# Patient Record
Sex: MALE | Race: BLACK OR AFRICAN AMERICAN | Employment: UNEMPLOYED | URBAN - METROPOLITAN AREA
[De-identification: names, ages, dates, MRNs, and addresses within clinical notes are randomized per-mention and may not be internally consistent; named-entity substitution may affect disease eponyms.]

---

## 2017-09-09 ENCOUNTER — HOSPITAL ENCOUNTER (EMERGENCY)
Age: 22
Discharge: HOME | End: 2017-09-09
Attending: EMERGENCY MEDICINE
Payer: MEDICAID

## 2017-09-09 VITALS
HEIGHT: 72 IN | OXYGEN SATURATION: 96 % | BODY MASS INDEX: 36.57 KG/M2 | DIASTOLIC BLOOD PRESSURE: 78 MMHG | HEART RATE: 84 BPM | RESPIRATION RATE: 14 BRPM | WEIGHT: 270 LBS | TEMPERATURE: 97.9 F | SYSTOLIC BLOOD PRESSURE: 132 MMHG

## 2017-09-09 DIAGNOSIS — S09.22XA: Primary | ICD-10-CM

## 2017-09-09 PROCEDURE — 6370000000 HC RX 637 (ALT 250 FOR IP): Performed by: EMERGENCY MEDICINE

## 2017-09-09 PROCEDURE — G0381 LEV 2 HOSP TYPE B ED VISIT: HCPCS

## 2017-09-09 RX ORDER — IBUPROFEN 800 MG/1
800 TABLET ORAL ONCE
Status: COMPLETED | OUTPATIENT
Start: 2017-09-09 | End: 2017-09-09

## 2017-09-09 RX ADMIN — IBUPROFEN 800 MG: 800 TABLET, FILM COATED ORAL at 14:34

## 2017-09-09 ASSESSMENT — PAIN SCALES - GENERAL: PAINLEVEL_OUTOF10: 7

## 2017-09-09 ASSESSMENT — ENCOUNTER SYMPTOMS
COLOR CHANGE: 0
VOMITING: 0
TROUBLE SWALLOWING: 0
NAUSEA: 0

## 2018-04-19 DIAGNOSIS — N46.9 INFERTILITY MALE: Primary | ICD-10-CM

## 2018-07-04 ENCOUNTER — HOSPITAL ENCOUNTER (EMERGENCY)
Age: 23
Discharge: HOME | End: 2018-07-05
Attending: EMERGENCY MEDICINE
Payer: MEDICAID

## 2018-07-04 ENCOUNTER — APPOINTMENT (OUTPATIENT)
Dept: CT IMAGING | Age: 23
End: 2018-07-04
Payer: MEDICAID

## 2018-07-04 VITALS
SYSTOLIC BLOOD PRESSURE: 152 MMHG | HEART RATE: 110 BPM | WEIGHT: 280 LBS | BODY MASS INDEX: 37.97 KG/M2 | TEMPERATURE: 97.2 F | RESPIRATION RATE: 16 BRPM | OXYGEN SATURATION: 99 % | DIASTOLIC BLOOD PRESSURE: 88 MMHG

## 2018-07-04 DIAGNOSIS — S01.81XA FACIAL LACERATION, INITIAL ENCOUNTER: Primary | ICD-10-CM

## 2018-07-04 PROCEDURE — 70450 CT HEAD/BRAIN W/O DYE: CPT

## 2018-07-04 PROCEDURE — 70486 CT MAXILLOFACIAL W/O DYE: CPT

## 2018-07-04 PROCEDURE — 12011 RPR F/E/E/N/L/M 2.5 CM/<: CPT

## 2018-07-04 PROCEDURE — 99284 EMERGENCY DEPT VISIT MOD MDM: CPT

## 2018-07-04 ASSESSMENT — ENCOUNTER SYMPTOMS
NAUSEA: 0
COLOR CHANGE: 0
EYE REDNESS: 0
EYE DISCHARGE: 0
ABDOMINAL PAIN: 0
CHEST TIGHTNESS: 0
FACIAL SWELLING: 1
EYE PAIN: 1
SHORTNESS OF BREATH: 0
VOMITING: 0

## 2018-07-04 ASSESSMENT — PAIN DESCRIPTION - ORIENTATION: ORIENTATION: RIGHT

## 2018-07-04 ASSESSMENT — PAIN DESCRIPTION - LOCATION: LOCATION: EYE

## 2018-07-04 ASSESSMENT — PAIN SCALES - GENERAL: PAINLEVEL_OUTOF10: 8

## 2018-07-04 ASSESSMENT — PAIN DESCRIPTION - PAIN TYPE: TYPE: ACUTE PAIN

## 2018-07-05 PROCEDURE — 96372 THER/PROPH/DIAG INJ SC/IM: CPT

## 2018-07-05 RX ORDER — ONDANSETRON 4 MG/1
4 TABLET, FILM COATED ORAL ONCE
Status: DISCONTINUED | OUTPATIENT
Start: 2018-07-05 | End: 2018-07-05 | Stop reason: HOSPADM

## 2018-07-05 RX ORDER — KETOROLAC TROMETHAMINE 30 MG/ML
30 INJECTION, SOLUTION INTRAMUSCULAR; INTRAVENOUS ONCE
Status: DISCONTINUED | OUTPATIENT
Start: 2018-07-05 | End: 2018-07-05 | Stop reason: HOSPADM
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
## 2018-07-12 ENCOUNTER — HOSPITAL ENCOUNTER (EMERGENCY)
Age: 23
Discharge: HOME | End: 2018-07-12
Attending: EMERGENCY MEDICINE
Payer: MEDICAID

## 2018-07-12 VITALS
DIASTOLIC BLOOD PRESSURE: 90 MMHG | TEMPERATURE: 98.2 F | HEART RATE: 91 BPM | SYSTOLIC BLOOD PRESSURE: 146 MMHG | OXYGEN SATURATION: 98 % | RESPIRATION RATE: 17 BRPM

## 2018-07-12 DIAGNOSIS — G44.309 POST-CONCUSSION HEADACHE: ICD-10-CM

## 2018-07-12 DIAGNOSIS — Z48.02 VISIT FOR SUTURE REMOVAL: Primary | ICD-10-CM

## 2018-07-12 PROCEDURE — 99281 EMR DPT VST MAYX REQ PHY/QHP: CPT

## 2018-07-12 ASSESSMENT — ENCOUNTER SYMPTOMS
ABDOMINAL PAIN: 0
EYE PAIN: 0
BACK PAIN: 0
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
## 2018-07-31 ENCOUNTER — HOSPITAL ENCOUNTER (EMERGENCY)
Age: 23
Discharge: PSYCHIATRIC OR SUBSTANCE USE FACILITY | End: 2018-07-31
Attending: EMERGENCY MEDICINE
Payer: MEDICAID

## 2018-07-31 VITALS
TEMPERATURE: 98.2 F | HEART RATE: 71 BPM | SYSTOLIC BLOOD PRESSURE: 151 MMHG | RESPIRATION RATE: 16 BRPM | DIASTOLIC BLOOD PRESSURE: 87 MMHG | OXYGEN SATURATION: 98 %

## 2018-07-31 DIAGNOSIS — T14.91XA SUICIDE ATTEMPT (HCC): Primary | ICD-10-CM

## 2018-07-31 LAB
ACETAMINOPHEN LEVEL: <5 UG/ML (ref 10–30)
ALBUMIN SERPL-MCNC: 4.7 G/DL (ref 3.5–5.2)
ALBUMIN/GLOBULIN RATIO: 1.6 (ref 1–2.5)
ALP BLD-CCNC: 46 U/L (ref 40–129)
ALT SERPL-CCNC: 32 U/L (ref 5–41)
AST SERPL-CCNC: 33 U/L
BILIRUB SERPL-MCNC: 0.59 MG/DL (ref 0.3–1.2)
BILIRUBIN DIRECT: 0.15 MG/DL
BILIRUBIN, INDIRECT: 0.44 MG/DL (ref 0–1)
EKG ATRIAL RATE: 83 BPM
EKG P AXIS: 61 DEGREES
EKG P-R INTERVAL: 152 MS
EKG Q-T INTERVAL: 386 MS
EKG QRS DURATION: 88 MS
EKG QTC CALCULATION (BAZETT): 453 MS
EKG R AXIS: 68 DEGREES
EKG T AXIS: 15 DEGREES
EKG VENTRICULAR RATE: 83 BPM
ETHANOL PERCENT: 0.01 %
ETHANOL: 11 MG/DL
GLOBULIN: NORMAL G/DL (ref 1.5–3.8)
SALICYLATE LEVEL: <1 MG/DL (ref 3–10)
TOTAL PROTEIN: 7.7 G/DL (ref 6.4–8.3)
TOXIC TRICYCLIC SC,BLOOD: NEGATIVE

## 2018-07-31 PROCEDURE — G0480 DRUG TEST DEF 1-7 CLASSES: HCPCS

## 2018-07-31 PROCEDURE — 96374 THER/PROPH/DIAG INJ IV PUSH: CPT

## 2018-07-31 PROCEDURE — 96375 TX/PRO/DX INJ NEW DRUG ADDON: CPT

## 2018-07-31 PROCEDURE — 80307 DRUG TEST PRSMV CHEM ANLYZR: CPT

## 2018-07-31 PROCEDURE — 99285 EMERGENCY DEPT VISIT HI MDM: CPT

## 2018-07-31 PROCEDURE — 6360000002 HC RX W HCPCS: Performed by: EMERGENCY MEDICINE

## 2018-07-31 PROCEDURE — 80076 HEPATIC FUNCTION PANEL: CPT

## 2018-07-31 PROCEDURE — 93005 ELECTROCARDIOGRAM TRACING: CPT

## 2018-07-31 RX ORDER — KETOROLAC TROMETHAMINE 15 MG/ML
15 INJECTION, SOLUTION INTRAMUSCULAR; INTRAVENOUS ONCE
Status: COMPLETED | OUTPATIENT
Start: 2018-07-31 | End: 2018-07-31

## 2018-07-31 RX ORDER — DIPHENHYDRAMINE HYDROCHLORIDE 50 MG/ML
25 INJECTION INTRAMUSCULAR; INTRAVENOUS ONCE
Status: COMPLETED | OUTPATIENT
Start: 2018-07-31 | End: 2018-07-31

## 2018-07-31 RX ORDER — KETOROLAC TROMETHAMINE 15 MG/ML
15 INJECTION, SOLUTION INTRAMUSCULAR; INTRAVENOUS ONCE
Status: DISCONTINUED | OUTPATIENT
Start: 2018-07-31 | End: 2018-07-31

## 2018-07-31 RX ORDER — DIPHENHYDRAMINE HCL 25 MG
25 TABLET ORAL ONCE
Status: DISCONTINUED | OUTPATIENT
Start: 2018-07-31 | End: 2018-07-31

## 2018-07-31 RX ADMIN — PROCHLORPERAZINE EDISYLATE 10 MG: 5 INJECTION INTRAMUSCULAR; INTRAVENOUS at 12:25

## 2018-07-31 RX ADMIN — DIPHENHYDRAMINE HYDROCHLORIDE 25 MG: 50 INJECTION, SOLUTION INTRAMUSCULAR; INTRAVENOUS at 12:25

## 2018-07-31 RX ADMIN — KETOROLAC TROMETHAMINE 15 MG: 15 INJECTION, SOLUTION INTRAMUSCULAR; INTRAVENOUS at 12:25

## 2018-07-31 ASSESSMENT — PAIN SCALES - GENERAL
PAINLEVEL_OUTOF10: 6
PAINLEVEL_OUTOF10: 6

## 2018-07-31 NOTE — ED NOTES
Spoke with [facility] RN Michael who reports that for medical clearance they need a blood alcohol level, tylenol level and liver enzymes. Also, comfort care for patient for headache and vomiting. No other needs at this time.       oP Mejia, [facility]  07/31/18 1042

## 2018-07-31 NOTE — ED NOTES
Spoke to [facility] re: pt ready for . Will continue to monitor.       Latanya Salazar, [facility]  07/31/18 8064
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
## 2018-07-31 NOTE — ED NOTES
Bed: 28  Expected date:   Expected time:   Means of arrival:   Comments:     Colette Abdul RN  07/31/18 102

## 2019-04-25 ENCOUNTER — HOSPITAL ENCOUNTER (EMERGENCY)
Age: 24
Discharge: HOME | End: 2019-04-25
Attending: EMERGENCY MEDICINE
Payer: MEDICAID

## 2019-04-25 VITALS
DIASTOLIC BLOOD PRESSURE: 64 MMHG | OXYGEN SATURATION: 97 % | TEMPERATURE: 98 F | HEIGHT: 72 IN | RESPIRATION RATE: 18 BRPM | BODY MASS INDEX: 40.52 KG/M2 | SYSTOLIC BLOOD PRESSURE: 124 MMHG | HEART RATE: 69 BPM | WEIGHT: 299.19 LBS

## 2019-04-25 DIAGNOSIS — L30.9 DERMATITIS: Primary | ICD-10-CM

## 2019-04-25 PROCEDURE — 99282 EMERGENCY DEPT VISIT SF MDM: CPT

## 2019-04-25 RX ORDER — PREDNISONE 50 MG/1
50 TABLET ORAL DAILY
Qty: 5 TABLET | Refills: 0 | Status: SHIPPED | OUTPATIENT
Start: 2019-04-25 | End: 2019-04-30

## 2019-04-25 RX ORDER — CLOTRIMAZOLE AND BETAMETHASONE DIPROPIONATE 10; .64 MG/G; MG/G
CREAM TOPICAL
Qty: 1 TUBE | Refills: 0 | Status: SHIPPED | OUTPATIENT
Start: 2019-04-25 | End: 2019-11-10

## 2019-04-25 RX ORDER — DIPHENHYDRAMINE HCL 25 MG
25 CAPSULE ORAL EVERY 6 HOURS PRN
Qty: 20 CAPSULE | Refills: 0 | Status: SHIPPED | OUTPATIENT
Start: 2019-04-25 | End: 2019-11-10

## 2019-04-25 ASSESSMENT — ENCOUNTER SYMPTOMS
RHINORRHEA: 0
SHORTNESS OF BREATH: 0
CONSTIPATION: 0
ABDOMINAL PAIN: 0
COUGH: 0
NAUSEA: 0
WHEEZING: 0
DIARRHEA: 0
COLOR CHANGE: 0
SINUS PRESSURE: 0
SORE THROAT: 0
VOMITING: 0
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
## 2019-04-25 NOTE — ED NOTES
ASSESSMENT:   Presents to ED with a dark pigmented rash to post neck. Thinks is from a necklace. Also has the same to his rt lower abdom, on his fat apron. States usually gets it in the summer when it's hot and he sweats, but has it  Now and it itches. No other c/o's. Mary Knapp RN  04/25/19 PIOTR Jacob RN  04/25/19 9471

## 2019-04-26 ENCOUNTER — TELEPHONE (OUTPATIENT)
Dept: DERMATOLOGY | Age: 24
End: 2019-04-26

## 2019-05-22 ENCOUNTER — HOSPITAL ENCOUNTER (EMERGENCY)
Age: 24
Discharge: HOME | End: 2019-05-22
Attending: EMERGENCY MEDICINE
Payer: COMMERCIAL

## 2019-05-22 VITALS
SYSTOLIC BLOOD PRESSURE: 132 MMHG | WEIGHT: 289.1 LBS | TEMPERATURE: 98.1 F | RESPIRATION RATE: 20 BRPM | HEART RATE: 89 BPM | BODY MASS INDEX: 39.16 KG/M2 | DIASTOLIC BLOOD PRESSURE: 76 MMHG | OXYGEN SATURATION: 100 % | HEIGHT: 72 IN

## 2019-05-22 DIAGNOSIS — M79.605 BILATERAL LEG PAIN: Primary | ICD-10-CM

## 2019-05-22 DIAGNOSIS — M79.604 BILATERAL LEG PAIN: Primary | ICD-10-CM

## 2019-05-22 LAB
ANION GAP SERPL CALCULATED.3IONS-SCNC: 12 MMOL/L (ref 9–17)
BUN BLDV-MCNC: 5 MG/DL (ref 6–20)
BUN/CREAT BLD: 6 (ref 9–20)
CALCIUM SERPL-MCNC: 9.2 MG/DL (ref 8.6–10.4)
CHLORIDE BLD-SCNC: 102 MMOL/L (ref 98–107)
CO2: 27 MMOL/L (ref 20–31)
CREAT SERPL-MCNC: 0.87 MG/DL (ref 0.7–1.2)
GFR AFRICAN AMERICAN: >60 ML/MIN
GFR NON-AFRICAN AMERICAN: >60 ML/MIN
GFR SERPL CREATININE-BSD FRML MDRD: ABNORMAL ML/MIN/{1.73_M2}
GFR SERPL CREATININE-BSD FRML MDRD: ABNORMAL ML/MIN/{1.73_M2}
GLUCOSE BLD-MCNC: 99 MG/DL (ref 70–99)
MAGNESIUM: 1.8 MG/DL (ref 1.6–2.6)
POTASSIUM SERPL-SCNC: 3.8 MMOL/L (ref 3.7–5.3)
SODIUM BLD-SCNC: 141 MMOL/L (ref 135–144)

## 2019-05-22 PROCEDURE — 99283 EMERGENCY DEPT VISIT LOW MDM: CPT

## 2019-05-22 PROCEDURE — 83735 ASSAY OF MAGNESIUM: CPT

## 2019-05-22 PROCEDURE — 6370000000 HC RX 637 (ALT 250 FOR IP): Performed by: NURSE PRACTITIONER

## 2019-05-22 PROCEDURE — 80048 BASIC METABOLIC PNL TOTAL CA: CPT

## 2019-05-22 RX ORDER — CALCIUM CARBONATE/VITAMIN D3 500-10/5ML
1 LIQUID (ML) ORAL EVERY EVENING
Qty: 24 CAPSULE | Refills: 0 | Status: SHIPPED | OUTPATIENT
Start: 2019-05-22 | End: 2019-11-10

## 2019-05-22 RX ORDER — CYCLOBENZAPRINE HCL 10 MG
10 TABLET ORAL ONCE
Status: COMPLETED | OUTPATIENT
Start: 2019-05-22 | End: 2019-05-22

## 2019-05-22 RX ORDER — CYCLOBENZAPRINE HCL 10 MG
10 TABLET ORAL 3 TIMES DAILY PRN
Qty: 18 TABLET | Refills: 0 | Status: SHIPPED | OUTPATIENT
Start: 2019-05-22 | End: 2019-05-28

## 2019-05-22 RX ADMIN — CYCLOBENZAPRINE HYDROCHLORIDE 10 MG: 10 TABLET, FILM COATED ORAL at 21:37

## 2019-05-22 ASSESSMENT — PAIN SCALES - GENERAL: PAINLEVEL_OUTOF10: 8

## 2019-05-23 NOTE — ED PROVIDER NOTES
[facility] ED  eMERGENCY dEPARTMENT eNCOUnter      Pt Name: Becky Chawla  MRN: [id number]  [place] 1995  Date of evaluation: 5/22/2019  Provider: Kaila Zeng       Chief Complaint   Patient presents with    Leg Pain     bilateral calf          HISTORY OF PRESENT ILLNESS  (Location/Symptom, Timing/Onset, Context/Setting, Quality, Duration, Modifying Factors, Severity.)   Becky Chawla is a 20 y.o. male who presents to the emergency department with complaints of bilateral calf pain. Onset 2 days ago. He states that as he moves around he begins to feel both legs cramping up. He also complains that he has \"charley horses\" during the night as well. He denies any trauma, use of any medications other than Lotrisone cream, no long travel, no history of DVT. He denies any sensory loss. He has been up and ambulatory. He is taken nothing for the symptoms. Nursing Notes were reviewed. ALLERGIES     Patient has no known allergies. CURRENT MEDICATIONS       Discharge Medication List as of 5/22/2019 10:48 PM      CONTINUE these medications which have NOT CHANGED    Details   clotrimazole-betamethasone (LOTRISONE) 1-0.05 % cream Apply topically 2 times daily. , Disp-1 Tube, R-0, Print      diphenhydrAMINE (BENADRYL) 25 MG capsule Take 1 capsule by mouth every 6 hours as needed for Itching, Disp-20 capsule, R-0Print      Acetaminophen 500 MG CAPS Take 1,000 mg by mouth every 6 hours as needed for Pain, Disp-60 capsule, R-0Print             PAST MEDICAL HISTORY   No past medical history on file. SURGICAL HISTORY     No past surgical history on file. FAMILY HISTORY     No family history on file. No family status information on file. SOCIAL HISTORY      reports that he has been smoking. He has been smoking about 0.50 packs per day. He has never used smokeless tobacco. He reports that he drinks alcohol.  He reports that he has current or past drug history. Drug: Marijuana. REVIEW OF SYSTEMS    (2-9 systems for level 4, 10 or more for level 5)     Denies fever/chills  Denies lightheadedness, headache  Denies URI symptoms  Denies recent travel, injury to the legs, long periods of immobilization  Denies Neck or back pain  Denies Shortness of breath, cough  Denies chest pain, palpitations       Except as noted above the remainder of the review of systems was reviewed and negative. PHYSICAL EXAM    (up to 7 for level 4, 8 or more for level 5)     ED Triage Vitals   BP Temp Temp src Pulse Resp SpO2 Height Weight   05/22/19 1925 05/22/19 1924 -- 05/22/19 1924 05/22/19 1924 05/22/19 1924 05/22/19 1924 05/22/19 1924   132/76 98.1 °F (36.7 °C)  89 20 100 % 6' (1.829 m) 289 lb 1.6 oz (131.1 kg)         General Appearance:  Alert, cooperative,    Head:  Normocephalic,     Eyes:  conjunctiva/corneas clear, EOM's intact. ENT: Mucous membranes moist.      Neck: Supple,  trachea midline. Lungs:   Lungs clear to auscultation. Pepe Bajwa Respirations eupneic   Heart: RRR, Normal skin color   Extremities:    Full ROM without painOr distal swelling. Bilateral discomfort to both calfs. No swelling, erythema. Pain increases with extension of legs. Negative Mateus's. Legs of equal warmth. Pulses:  DP/PT pulses 3+/4   Skin: No rashes or lesions to exposed skin   Neurologic: Alert and oriented.  Motor grossly normal. Speech clear                 DIAGNOSTIC RESULTS      RADIOLOGY:   Non-plain film images such as CT, Ultrasound and MRI are read by the radiologist. Plain radiographic images are visualized and preliminarily interpreted by the emergency physician with the below findings:       Interpretation per the Radiologist below, if available at the time of this note:    No orders to display           LABS:  [address] - Abnormal; Notable for the following components:       Result Value    BUN 5 (*)     Bun/Cre Ratio 6 (*)     All other components within normal limits   MAGNESIUM       All other labs were within normal range or not returned as of this dictation. EMERGENCY DEPARTMENT COURSE and DIFFERENTIAL DIAGNOSIS/MDM:   Vitals:    Vitals:    19 1924 19   BP:  132/76   Pulse: 89    Resp: 20    Temp: 98.1 °F (36.7 °C)    SpO2: 100%    Weight: 289 lb 1.6 oz (131.1 kg)    Height: 6' (1.829 m)        Medical Decision Makinyo male with \"charley horses\" of both legs. Electrolytes and magnesium normal. No concern for DVT. He will be discharged with instructions to use mag oxide, increase fluids and eat a balanced diet. The patient left upright and ambulatory without limp. CONSULTS:  None    PROCEDURES:  None    FINAL IMPRESSION      1.  Bilateral leg pain          DISPOSITION/PLAN   DISPOSITION Decision To Discharge 2019 10:46:31 PM      PATIENT REFERRED TO:   [facility] ED  [address] [phone]  Go to   As needed, If symptoms worsen      DISCHARGE MEDICATIONS:     Discharge Medication List as of 2019 10:48 PM      START taking these medications    Details   cyclobenzaprine (FLEXERIL) 10 MG tablet Take 1 tablet by mouth 3 times daily as needed for Muscle spasms, Disp-18 tablet, R-0Print      Magnesium Oxide 400 MG CAPS Take 1 capsule by mouth every evening, Disp-24 capsule, R-0Print                 (Please note that portions of this note were completed with a voice recognition program.  Efforts were made to edit the dictations but occasionally words are mis-transcribed.)    MANSOOR Lamar CNP  Certified Nurse Practitioner          MANSOOR Lamar CNP  [phone] [facility] APRN - CNP  [phone]

## 2019-05-23 NOTE — ED TRIAGE NOTES
Pt to ED c/o bilateral calf pain x2 days. Pt AOx4. States he has done more walking than usual. Pain intermittent. Relieved by elevating legs. Pt denies other symptoms. Patent airway, no dyspnea or cyanosis noted.

## 2019-11-10 ENCOUNTER — HOSPITAL ENCOUNTER (EMERGENCY)
Age: 24
Discharge: HOME | End: 2019-11-10
Attending: EMERGENCY MEDICINE
Payer: MEDICAID

## 2019-11-10 VITALS
TEMPERATURE: 98.2 F | HEIGHT: 71 IN | DIASTOLIC BLOOD PRESSURE: 70 MMHG | OXYGEN SATURATION: 99 % | HEART RATE: 93 BPM | RESPIRATION RATE: 14 BRPM | SYSTOLIC BLOOD PRESSURE: 131 MMHG | WEIGHT: 285 LBS | BODY MASS INDEX: 39.9 KG/M2

## 2019-11-10 DIAGNOSIS — B00.1 COLD SORE: Primary | ICD-10-CM

## 2019-11-10 PROCEDURE — 99283 EMERGENCY DEPT VISIT LOW MDM: CPT

## 2019-11-10 RX ORDER — VALACYCLOVIR HYDROCHLORIDE 1 G/1
1000 TABLET, FILM COATED ORAL 3 TIMES DAILY
Qty: 21 TABLET | Refills: 0 | Status: SHIPPED | OUTPATIENT
Start: 2019-11-10 | End: 2019-11-17

## 2019-11-10 ASSESSMENT — PAIN SCALES - GENERAL: PAINLEVEL_OUTOF10: 3

## 2019-11-10 ASSESSMENT — ENCOUNTER SYMPTOMS
FACIAL SWELLING: 0
SINUS PAIN: 0
EYES NEGATIVE: 1
SINUS PRESSURE: 0
SORE THROAT: 0
COUGH: 0
RHINORRHEA: 0
WHEEZING: 0
CHEST TIGHTNESS: 0
VOMITING: 0
TROUBLE SWALLOWING: 0
VOICE CHANGE: 0
NAUSEA: 0

## 2019-11-10 ASSESSMENT — PAIN DESCRIPTION - ORIENTATION: ORIENTATION: LOWER;OUTER

## 2019-11-10 ASSESSMENT — PAIN DESCRIPTION - LOCATION: LOCATION: MOUTH

## 2019-11-10 ASSESSMENT — PAIN DESCRIPTION - DESCRIPTORS: DESCRIPTORS: TINGLING

## 2020-01-20 ENCOUNTER — APPOINTMENT (OUTPATIENT)
Dept: GENERAL RADIOLOGY | Age: 25
End: 2020-01-20
Payer: MEDICAID

## 2020-01-20 ENCOUNTER — HOSPITAL ENCOUNTER (EMERGENCY)
Age: 25
Discharge: HOME | End: 2020-01-20
Attending: EMERGENCY MEDICINE
Payer: MEDICAID

## 2020-01-20 VITALS
TEMPERATURE: 97 F | HEIGHT: 71 IN | DIASTOLIC BLOOD PRESSURE: 77 MMHG | SYSTOLIC BLOOD PRESSURE: 128 MMHG | BODY MASS INDEX: 39.9 KG/M2 | HEART RATE: 80 BPM | WEIGHT: 285 LBS | OXYGEN SATURATION: 100 % | RESPIRATION RATE: 15 BRPM

## 2020-01-20 PROCEDURE — 12002 RPR S/N/AX/GEN/TRNK2.6-7.5CM: CPT

## 2020-01-20 PROCEDURE — 2500000003 HC RX 250 WO HCPCS: Performed by: STUDENT IN AN ORGANIZED HEALTH CARE EDUCATION/TRAINING PROGRAM

## 2020-01-20 PROCEDURE — 90471 IMMUNIZATION ADMIN: CPT | Performed by: STUDENT IN AN ORGANIZED HEALTH CARE EDUCATION/TRAINING PROGRAM

## 2020-01-20 PROCEDURE — 99283 EMERGENCY DEPT VISIT LOW MDM: CPT

## 2020-01-20 PROCEDURE — 73130 X-RAY EXAM OF HAND: CPT

## 2020-01-20 PROCEDURE — 90715 TDAP VACCINE 7 YRS/> IM: CPT | Performed by: STUDENT IN AN ORGANIZED HEALTH CARE EDUCATION/TRAINING PROGRAM

## 2020-01-20 PROCEDURE — 6360000002 HC RX W HCPCS: Performed by: STUDENT IN AN ORGANIZED HEALTH CARE EDUCATION/TRAINING PROGRAM

## 2020-01-20 PROCEDURE — 6360000002 HC RX W HCPCS

## 2020-01-20 RX ORDER — CEFAZOLIN SODIUM 1 G/50ML
INJECTION, SOLUTION INTRAVENOUS
Status: COMPLETED
Start: 2020-01-20 | End: 2020-01-20

## 2020-01-20 RX ORDER — LIDOCAINE HYDROCHLORIDE 10 MG/ML
20 INJECTION, SOLUTION INFILTRATION; PERINEURAL ONCE
Status: COMPLETED | OUTPATIENT
Start: 2020-01-20 | End: 2020-01-20

## 2020-01-20 RX ORDER — IBUPROFEN 800 MG/1
800 TABLET ORAL ONCE
Status: DISCONTINUED | OUTPATIENT
Start: 2020-01-20 | End: 2020-01-20 | Stop reason: HOSPADM

## 2020-01-20 RX ORDER — CEPHALEXIN 500 MG/1
500 CAPSULE ORAL 4 TIMES DAILY
Qty: 40 CAPSULE | Refills: 0 | Status: SHIPPED | OUTPATIENT
Start: 2020-01-20 | End: 2020-01-30

## 2020-01-20 RX ORDER — FENTANYL CITRATE 50 UG/ML
25 INJECTION, SOLUTION INTRAMUSCULAR; INTRAVENOUS ONCE
Status: COMPLETED | OUTPATIENT
Start: 2020-01-20 | End: 2020-01-20

## 2020-01-20 RX ORDER — FENTANYL CITRATE 50 UG/ML
INJECTION, SOLUTION INTRAMUSCULAR; INTRAVENOUS
Status: COMPLETED
Start: 2020-01-20 | End: 2020-01-20

## 2020-01-20 RX ORDER — IBUPROFEN 600 MG/1
600 TABLET ORAL EVERY 6 HOURS PRN
Qty: 20 TABLET | Refills: 0 | Status: SHIPPED | OUTPATIENT
Start: 2020-01-20 | End: 2022-01-01

## 2020-01-20 RX ORDER — ACETAMINOPHEN 325 MG/1
650 TABLET ORAL EVERY 6 HOURS PRN
Qty: 20 TABLET | Refills: 0 | Status: SHIPPED | OUTPATIENT
Start: 2020-01-20 | End: 2022-01-01

## 2020-01-20 RX ADMIN — TETANUS TOXOID, REDUCED DIPHTHERIA TOXOID AND ACELLULAR PERTUSSIS VACCINE, ADSORBED 0.5 ML: 5; 2.5; 8; 8; 2.5 SUSPENSION INTRAMUSCULAR at 08:52

## 2020-01-20 RX ADMIN — CEFAZOLIN SODIUM 2 G: 1 INJECTION, SOLUTION INTRAVENOUS at 10:16

## 2020-01-20 RX ADMIN — FENTANYL CITRATE 25 MCG: 50 INJECTION, SOLUTION INTRAMUSCULAR; INTRAVENOUS at 10:35

## 2020-01-20 RX ADMIN — LIDOCAINE HYDROCHLORIDE 20 ML: 10 INJECTION, SOLUTION INFILTRATION; PERINEURAL at 08:52

## 2020-01-20 ASSESSMENT — PAIN DESCRIPTION - DESCRIPTORS: DESCRIPTORS: ACHING

## 2020-01-20 ASSESSMENT — PAIN SCALES - GENERAL
PAINLEVEL_OUTOF10: 10

## 2020-01-20 ASSESSMENT — PAIN DESCRIPTION - PAIN TYPE: TYPE: ACUTE PAIN

## 2020-01-20 ASSESSMENT — PAIN DESCRIPTION - PROGRESSION: CLINICAL_PROGRESSION: GRADUALLY WORSENING

## 2020-01-20 ASSESSMENT — PAIN DESCRIPTION - LOCATION: LOCATION: HAND

## 2020-01-20 ASSESSMENT — PAIN DESCRIPTION - ORIENTATION: ORIENTATION: RIGHT

## 2020-01-20 ASSESSMENT — PAIN DESCRIPTION - ONSET: ONSET: SUDDEN

## 2020-01-20 NOTE — CONSULTS
Orthopedic Surgery Consult  (Dr. Bereket Vargas)                   CC/Reason for consult:  Right hand foreign body    HPI:    The patient is a 30 y.o. male patient who fell on the ice last night at 1 am. At the time he had a small lac on his right palm, did not think much of it. He had continued pain today and presented to the ED. Denies any other pains or concerns at this time. Did not hit head or LOC. Denies fevers or chills. Tetanus and Ancef given in ED. Past Medical History:    History reviewed. No pertinent past medical history. Past Surgical History:    History reviewed. No pertinent surgical history. Medications Prior to Admission:   Prior to Admission medications    Medication Sig Start Date End Date Taking? Authorizing Provider   cephALEXin (KEFLEX) 500 MG capsule Take 1 capsule by mouth 4 times daily for 10 days 1/20/20 1/30/20 Yes Elfego Camarillo MD   acetaminophen (TYLENOL) 325 MG tablet Take 2 tablets by mouth every 6 hours as needed for Pain 1/20/20  Yes Elfego Camarillo MD   ibuprofen (ADVIL;MOTRIN) 600 MG tablet Take 1 tablet by mouth every 6 hours as needed for Pain 1/20/20  Yes Elfego Camarillo MD       Allergies:    Patient has no known allergies.     Social History:   Social History     Socioeconomic History    Marital status: Single     Spouse name: None    Number of children: None    Years of education: None    Highest education level: None   Occupational History    None   Social Needs    Financial resource strain: None    Food insecurity:     Worry: None     Inability: None    Transportation needs:     Medical: None     Non-medical: None   Tobacco Use    Smoking status: Current Every Day Smoker     Packs/day: 0.50     Types: Cigarettes    Smokeless tobacco: Never Used   Substance and Sexual Activity    Alcohol use: Yes     Comment: social     Drug use: Yes     Types: Marijuana    Sexual activity: None   Lifestyle    Physical activity:     Days per week: None Minutes per session: None    Stress: None   Relationships    Social connections:     Talks on phone: None     Gets together: None     Attends Gnosticism service: None     Active member of club or organization: None     Attends meetings of clubs or organizations: None     Relationship status: None    Intimate partner violence:     Fear of current or ex partner: None     Emotionally abused: None     Physically abused: None     Forced sexual activity: None   Other Topics Concern    None   Social History Narrative    None       Family History:  History reviewed. No pertinent family history. REVIEW OF SYSTEMS:    Constitutional: Negative for fever and chills. HENT: Negative for congestion. Eyes: Negative for blurred vision and double vision. Respiratory: Negative for cough, shortness of breath and wheezing. Cardiovascular: Negative for chest pain and palpitations. Gastrointestinal: Negative for nausea. Negative for vomiting. Musculoskeletal: Positive for right hand pain. See HPI   Skin: Negative for itching and rash. Neurological: Negative for dizziness, sensory change and headaches. Psychiatric/Behavioral: Negative for depression and suicidal ideas. PHYSICAL EXAM:  /77   Pulse 80   Temp 97 °F (36.1 °C) (Oral)   Resp 15   Ht 5' 11\" (1.803 m)   Wt 285 lb (129.3 kg)   SpO2 100%   BMI 39.75 kg/m²     Gen: AAOx3, NAD, cooperative     Head: Normocephalic, atraumatic     Neck: Supple     Chest: Non labored breathing, b/l clavicles without TTP, crepitus, step off, or deformity    Cardiovascular: Regular rate, no dependent edema, distal pulses 2+     Respiratory: Chest symmetric, no accessory muscle use, normal respirations     Abdomen: Soft, NT, ND     RUE: Small 1 cm lac over thenar eminence. No erythema or drainage. No ecchymosis. Foreign body palpated in thenar. Tender to palpation over thenar eminence. Full AROM without pain shoulder/elbow/wrist. Compartments soft and compressible. Ulnar/Median/AIN/PIN motor intact. Median/Radial/Ulnar nerve SILT. Hand and fingers warm and well-perfused w/ BCR; radial pulse 2+. LABS:  No results for input(s): WBC, HGB, HCT, PLT, INR, PTT, NA, K, BUN, CREATININE, GLUCOSE, SEDRATE, CRP in the last 72 hours. Invalid input(s): PT     Radiology:   X-ray right hand demonstrates foreign body in thenar eminence, post removal films demonstrate interval removal of foreign body    A/P: 30 y.o. male being seen for right hand foreign body    -Foreign body removed under local anesthetic provided by ED. Copious irrigation performed. Procedure note below.  -Ancef and Tetanus updated in ED  -Maintain dressings as instructed  -Weight bearing: Non weight bearing right upper extremity  -Pain control per ED  -Ice and elevation for pain/swelling  -Keflex for one week  -Follow up with hand/burn clinic in one week for wound check  -Please page Ortho with any questions or concerns    Bedside incision and removal procedure note  The alternatives, benefits, and risks were discussed with the patient. After answering all questions to the patient's satisfaction, the patient agreed to proceed forward with Incision and removal and gave verbal consent for the procedure. Local anesthesia provided by ED resident. After sterile preparation with betadine and adequate anesthesia was obtained, a #11 blade was used to incise the skin over thenar extending the laceration 5 mm in the proximal direction over the foreign body. Thenar skin did have mild maceration from the fall over the radial aspect of laceration. A hemostat was used to probe and remove the foreign body, ensuring protection of the neurovascular structures. Wound was copiously irrigated with betadine/normal saline. 4-0 Prolene was used to close wound in a simple fashion by ED resident. The wound was then dressed with bacitracin and sterile dressings. Patient tolerated the procedure well and all questions/concerns were answered. Neurovascularly intact post procedure.     EBL <5MN, without complications.    ----------------------------------------  Kari Gomes DO  PGY-2, Department of [address], [place], [place]

## 2020-01-20 NOTE — ED PROVIDER NOTES
Emergency Medicine Attending Note    I have seen and examined the patient in conjunction with the Resident/MLP. Please see my key portion documented:      I agree with the assessment and plan as discussed with Dr. Brendan Jefferson. 49-year-old gentleman with somewhat late presentation of palmar laceration. The foreign body clearly visualized on x-ray that is radiodense. Very difficult removal and will consult with hand surgery. Will likely placed on antibiotics and leave laceration open and have patient follow-up. Electronically signed:  Tanya Bowers M.D.           Sindy Evans MD  01/20/20 8776
retrieve foreign body. Discussed with Dr. Jj Fiore, hand surgeon who recommended evaluation by orthopedic surgery resident and dose of IV antibiotics. Dr. Ciara Cerda performed foreign body removal successfully, removed approximately 1 x 1 cm stone. Patient tolerated well. I closed the patient's laceration without difficulty. Dr. Jj Fiore updated on successful foreign body removal and laceration repair. Will discharge patient with oral antibiotics. Tetanus updated in the emergency department. Patient became verbally abusive with staff prior to discharge due to persistent pain despite receiving fentanyl for pain as well as local anesthetic. Patient stable for outpatient follow-up. He was given strict return precautions for any signs of infection, he voices understanding. PROCEDURES:  Laceration Repair Procedure Note    Indication: Laceration    Procedure: The patient was placed in the appropriate position and anesthesia around the laceration was obtained by infiltration using 1% Lidocaine without epinephrine. The area was then cleansed with betadine and draped in a sterile fashion and irrigated with normal saline. The laceration was closed with 4-0 and 5-0 Prolene using interrupted sutures. There were no additional lacerations requiring repair. The wound area was then dressed with bacitracin and a sterile dressing. Total repaired wound length: 3 cm. Other Items: Suture count: 6    The patient tolerated the procedure well. Complications: None      CONSULTS:  IP CONSULT TO PLASTIC SURGERY  IP CONSULT TO ORTHOPEDIC SURGERY    CRITICAL CARE:  None    FINAL IMPRESSION      1.  Laceration of right hand with foreign body, initial encounter          DISPOSITION / PLAN     DISPOSITION Decision To Discharge 01/20/2020 11:27:26 AM      PATIENT REFERRED TO:  [facility] ED  [address] [address]  [phone]    If symptoms worsen    Monika Christianson

## 2021-06-21 ENCOUNTER — HOSPITAL ENCOUNTER (EMERGENCY)
Age: 26
Discharge: HOME | End: 2021-06-21
Attending: EMERGENCY MEDICINE
Payer: MEDICAID

## 2021-06-21 ENCOUNTER — APPOINTMENT (OUTPATIENT)
Dept: GENERAL RADIOLOGY | Age: 26
End: 2021-06-21
Payer: MEDICAID

## 2021-06-21 VITALS
HEIGHT: 72 IN | BODY MASS INDEX: 35.89 KG/M2 | HEART RATE: 78 BPM | RESPIRATION RATE: 16 BRPM | DIASTOLIC BLOOD PRESSURE: 67 MMHG | OXYGEN SATURATION: 99 % | SYSTOLIC BLOOD PRESSURE: 139 MMHG | WEIGHT: 265 LBS | TEMPERATURE: 98.3 F

## 2021-06-21 DIAGNOSIS — S39.012A STRAIN OF LUMBAR REGION, INITIAL ENCOUNTER: Primary | ICD-10-CM

## 2021-06-21 DIAGNOSIS — L05.91 PILONIDAL CYST: ICD-10-CM

## 2021-06-21 PROCEDURE — 99285 EMERGENCY DEPT VISIT HI MDM: CPT

## 2021-06-21 PROCEDURE — 72100 X-RAY EXAM L-S SPINE 2/3 VWS: CPT

## 2021-06-21 PROCEDURE — 6370000000 HC RX 637 (ALT 250 FOR IP): Performed by: EMERGENCY MEDICINE

## 2021-06-21 RX ORDER — DOXYCYCLINE 100 MG/1
100 TABLET ORAL 2 TIMES DAILY
Qty: 20 TABLET | Refills: 0 | Status: SHIPPED | OUTPATIENT
Start: 2021-06-21 | End: 2021-07-01

## 2021-06-21 RX ORDER — ACETAMINOPHEN 325 MG/1
650 TABLET ORAL EVERY 6 HOURS PRN
Qty: 40 TABLET | Refills: 0 | Status: SHIPPED | OUTPATIENT
Start: 2021-06-21 | End: 2022-01-01

## 2021-06-21 RX ORDER — IBUPROFEN 800 MG/1
800 TABLET ORAL ONCE
Status: COMPLETED | OUTPATIENT
Start: 2021-06-21 | End: 2021-06-21

## 2021-06-21 RX ORDER — IBUPROFEN 800 MG/1
800 TABLET ORAL EVERY 8 HOURS PRN
Qty: 30 TABLET | Refills: 0 | Status: SHIPPED | OUTPATIENT
Start: 2021-06-21 | End: 2022-01-01

## 2021-06-21 RX ORDER — ACETAMINOPHEN 325 MG/1
650 TABLET ORAL ONCE
Status: COMPLETED | OUTPATIENT
Start: 2021-06-21 | End: 2021-06-21

## 2021-06-21 RX ORDER — CYCLOBENZAPRINE HCL 10 MG
10 TABLET ORAL NIGHTLY PRN
Qty: 10 TABLET | Refills: 0 | Status: SHIPPED | OUTPATIENT
Start: 2021-06-21 | End: 2021-07-01

## 2021-06-21 RX ADMIN — IBUPROFEN 800 MG: 800 TABLET, FILM COATED ORAL at 13:18

## 2021-06-21 RX ADMIN — ACETAMINOPHEN 650 MG: 325 TABLET ORAL at 13:18

## 2021-06-21 ASSESSMENT — ENCOUNTER SYMPTOMS
EYE REDNESS: 0
SHORTNESS OF BREATH: 0
TROUBLE SWALLOWING: 0
BLOOD IN STOOL: 1
VOMITING: 0
BACK PAIN: 1
ABDOMINAL PAIN: 0

## 2021-06-21 ASSESSMENT — PAIN SCALES - GENERAL
PAINLEVEL_OUTOF10: 8
PAINLEVEL_OUTOF10: 8

## 2021-06-21 ASSESSMENT — PAIN DESCRIPTION - LOCATION: LOCATION: BACK

## 2021-06-21 ASSESSMENT — PAIN DESCRIPTION - DESCRIPTORS: DESCRIPTORS: SHARP;THROBBING

## 2021-06-21 ASSESSMENT — PAIN DESCRIPTION - FREQUENCY: FREQUENCY: INTERMITTENT
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
## 2021-06-21 NOTE — ED NOTES
Pt called out c/o rectal pain. Writer at bedside with DR. Monty Burr.   Pt has abscess noted to the right medial buttocks with serosanguinous drainage noted at this time      Saroj Maldonado RN  06/21/21 3062
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
## 2022-01-01 ENCOUNTER — HOSPITAL ENCOUNTER (EMERGENCY)
Age: 27
Discharge: ANOTHER HOSPITAL | End: 2022-01-01
Attending: EMERGENCY MEDICINE
Payer: MEDICAID

## 2022-01-01 VITALS
WEIGHT: 250 LBS | HEART RATE: 85 BPM | HEIGHT: 71 IN | OXYGEN SATURATION: 100 % | RESPIRATION RATE: 16 BRPM | BODY MASS INDEX: 35 KG/M2 | DIASTOLIC BLOOD PRESSURE: 77 MMHG | TEMPERATURE: 97.2 F | SYSTOLIC BLOOD PRESSURE: 131 MMHG

## 2022-01-01 DIAGNOSIS — F32.A DEPRESSION WITH SUICIDAL IDEATION: Primary | ICD-10-CM

## 2022-01-01 DIAGNOSIS — R45.851 DEPRESSION WITH SUICIDAL IDEATION: Primary | ICD-10-CM

## 2022-01-01 DIAGNOSIS — F11.10 OPIATE ABUSE, CONTINUOUS (HCC): ICD-10-CM

## 2022-01-01 LAB
ABSOLUTE EOS #: <0.03 K/UL (ref 0–0.44)
ABSOLUTE IMMATURE GRANULOCYTE: <0.03 K/UL (ref 0–0.3)
ABSOLUTE LYMPH #: 1.61 K/UL (ref 1.1–3.7)
ABSOLUTE MONO #: 0.32 K/UL (ref 0.1–1.2)
ACETAMINOPHEN LEVEL: <5 UG/ML (ref 10–30)
ALBUMIN SERPL-MCNC: 4 G/DL (ref 3.5–5.2)
ALBUMIN/GLOBULIN RATIO: 1.5 (ref 1–2.5)
ALP BLD-CCNC: 52 U/L (ref 40–129)
ALT SERPL-CCNC: 17 U/L (ref 5–41)
AMPHETAMINE SCREEN URINE: NEGATIVE
ANION GAP SERPL CALCULATED.3IONS-SCNC: 9 MMOL/L (ref 9–17)
AST SERPL-CCNC: 21 U/L
BARBITURATE SCREEN URINE: NEGATIVE
BASOPHILS # BLD: 0 % (ref 0–2)
BASOPHILS ABSOLUTE: <0.03 K/UL (ref 0–0.2)
BENZODIAZEPINE SCREEN, URINE: NEGATIVE
BILIRUB SERPL-MCNC: 0.39 MG/DL (ref 0.3–1.2)
BUN BLDV-MCNC: 5 MG/DL (ref 6–20)
BUN/CREAT BLD: ABNORMAL (ref 9–20)
BUPRENORPHINE URINE: ABNORMAL
CALCIUM SERPL-MCNC: 9.2 MG/DL (ref 8.6–10.4)
CANNABINOID SCREEN URINE: POSITIVE
CHLORIDE BLD-SCNC: 107 MMOL/L (ref 98–107)
CO2: 26 MMOL/L (ref 20–31)
COCAINE METABOLITE, URINE: POSITIVE
CREAT SERPL-MCNC: 0.79 MG/DL (ref 0.7–1.2)
DIFFERENTIAL TYPE: ABNORMAL
EOSINOPHILS RELATIVE PERCENT: 1 % (ref 1–4)
ETHANOL PERCENT: <0.01 %
ETHANOL: <10 MG/DL
GFR AFRICAN AMERICAN: >60 ML/MIN
GFR NON-AFRICAN AMERICAN: >60 ML/MIN
GFR SERPL CREATININE-BSD FRML MDRD: ABNORMAL ML/MIN/{1.73_M2}
GFR SERPL CREATININE-BSD FRML MDRD: ABNORMAL ML/MIN/{1.73_M2}
GLUCOSE BLD-MCNC: 90 MG/DL (ref 70–99)
HCT VFR BLD CALC: 45 % (ref 40.7–50.3)
HEMOGLOBIN: 15.1 G/DL (ref 13–17)
IMMATURE GRANULOCYTES: 0 %
LYMPHOCYTES # BLD: 37 % (ref 24–43)
MCH RBC QN AUTO: 30.1 PG (ref 25.2–33.5)
MCHC RBC AUTO-ENTMCNC: 33.6 G/DL (ref 28.4–34.8)
MCV RBC AUTO: 89.8 FL (ref 82.6–102.9)
MDMA URINE: ABNORMAL
METHADONE SCREEN, URINE: NEGATIVE
METHAMPHETAMINE, URINE: ABNORMAL
MONOCYTES # BLD: 7 % (ref 3–12)
NRBC AUTOMATED: 0 PER 100 WBC
OPIATES, URINE: NEGATIVE
OXYCODONE SCREEN URINE: NEGATIVE
PDW BLD-RTO: 11.6 % (ref 11.8–14.4)
PHENCYCLIDINE, URINE: NEGATIVE
PLATELET # BLD: 278 K/UL (ref 138–453)
PLATELET ESTIMATE: ABNORMAL
PMV BLD AUTO: 9.3 FL (ref 8.1–13.5)
POTASSIUM SERPL-SCNC: 4.4 MMOL/L (ref 3.7–5.3)
PROPOXYPHENE, URINE: ABNORMAL
RBC # BLD: 5.01 M/UL (ref 4.21–5.77)
RBC # BLD: ABNORMAL 10*6/UL
SALICYLATE LEVEL: <1 MG/DL (ref 3–10)
SARS-COV-2, RAPID: DETECTED
SEG NEUTROPHILS: 55 % (ref 36–65)
SEGMENTED NEUTROPHILS ABSOLUTE COUNT: 2.38 K/UL (ref 1.5–8.1)
SODIUM BLD-SCNC: 142 MMOL/L (ref 135–144)
SPECIMEN DESCRIPTION: ABNORMAL
TEST INFORMATION: ABNORMAL
TOTAL PROTEIN: 6.6 G/DL (ref 6.4–8.3)
TOXIC TRICYCLIC SC,BLOOD: NEGATIVE
TRICYCLIC ANTIDEPRESSANTS, UR: ABNORMAL
WBC # BLD: 4.3 K/UL (ref 3.5–11.3)
WBC # BLD: ABNORMAL 10*3/UL

## 2022-01-01 PROCEDURE — G0480 DRUG TEST DEF 1-7 CLASSES: HCPCS

## 2022-01-01 PROCEDURE — 80307 DRUG TEST PRSMV CHEM ANLYZR: CPT

## 2022-01-01 PROCEDURE — H0050 ALCOHOL/DRUG SERVICE 15 MIN: HCPCS | Performed by: SOCIAL WORKER

## 2022-01-01 PROCEDURE — 80143 DRUG ASSAY ACETAMINOPHEN: CPT

## 2022-01-01 PROCEDURE — 87635 SARS-COV-2 COVID-19 AMP PRB: CPT

## 2022-01-01 PROCEDURE — 80179 DRUG ASSAY SALICYLATE: CPT

## 2022-01-01 PROCEDURE — 80053 COMPREHEN METABOLIC PANEL: CPT

## 2022-01-01 PROCEDURE — 85025 COMPLETE CBC W/AUTO DIFF WBC: CPT

## 2022-01-01 PROCEDURE — 99285 EMERGENCY DEPT VISIT HI MDM: CPT

## 2022-01-01 ASSESSMENT — ENCOUNTER SYMPTOMS
NAUSEA: 0
SORE THROAT: 0
ABDOMINAL PAIN: 0
VOMITING: 0
CONSTIPATION: 0
SHORTNESS OF BREATH: 0
DIARRHEA: 0

## 2022-01-01 ASSESSMENT — PATIENT HEALTH QUESTIONNAIRE - PHQ9: SUM OF ALL RESPONSES TO PHQ QUESTIONS 1-9: 18

## 2022-01-01 NOTE — ED NOTES
Pt to ed with c/o wanting help for fentanyl use and feeling suicidal. Pt states he uses fentanyl, snorted this am. Pt states he feels a bit of nausea and abdominal due to starting feel like he is withdrawling. Pt states he has been feeling suicidal for the past several days with plan to stab himself. Pt has been admitted to [facility] in the past but it has been several years. Pt is alert, oriented, speaking in full, complete sentences, no distress noted. Pt states he is tired of doing this to himself and just wants to feel better, get help and not just a quick fix.  Pt states he has never been in treatment for opoid use in the past.      Yoshi Champion RN  01/01/22 2538

## 2022-01-01 NOTE — ED TRIAGE NOTES
\"I got on bad drugs and I don't feel safe with myself. I am ready to get help. \" Patient reports snorting Fentanyl last use was \"earlier today. \" Patient reports use of Fentanyl for 1 year.   Suicidal with a plan; \"I would try to stab myself\"

## 2022-01-02 ENCOUNTER — HOSPITAL ENCOUNTER (INPATIENT)
Age: 27
LOS: 2 days | Discharge: HOME | DRG: 751 | End: 2022-01-04
Attending: PSYCHIATRY & NEUROLOGY | Admitting: PSYCHIATRY & NEUROLOGY
Payer: MEDICAID

## 2022-01-02 PROBLEM — F32.3 MAJOR DEPRESSIVE DISORDER, SINGLE EPISODE, SEVERE WITH PSYCHOTIC FEATURES (HCC): Status: ACTIVE | Noted: 2022-01-02

## 2022-01-02 PROCEDURE — 6360000002 HC RX W HCPCS: Performed by: PSYCHIATRY & NEUROLOGY

## 2022-01-02 PROCEDURE — 99223 1ST HOSP IP/OBS HIGH 75: CPT | Performed by: PSYCHIATRY & NEUROLOGY

## 2022-01-02 PROCEDURE — 6370000000 HC RX 637 (ALT 250 FOR IP): Performed by: PSYCHIATRY & NEUROLOGY

## 2022-01-02 PROCEDURE — 1240000000 HC EMOTIONAL WELLNESS R&B

## 2022-01-02 PROCEDURE — APPSS60 APP SPLIT SHARED TIME 46-60 MINUTES: Performed by: PSYCHIATRY & NEUROLOGY

## 2022-01-02 RX ORDER — SERTRALINE HYDROCHLORIDE 25 MG/1
25 TABLET, FILM COATED ORAL DAILY
Status: DISCONTINUED | OUTPATIENT
Start: 2022-01-02 | End: 2022-01-04 | Stop reason: HOSPADM

## 2022-01-02 RX ORDER — HYDROXYZINE 50 MG/1
50 TABLET, FILM COATED ORAL 3 TIMES DAILY PRN
Status: DISCONTINUED | OUTPATIENT
Start: 2022-01-02 | End: 2022-01-04 | Stop reason: HOSPADM

## 2022-01-02 RX ORDER — POLYETHYLENE GLYCOL 3350 17 G/17G
17 POWDER, FOR SOLUTION ORAL DAILY PRN
Status: DISCONTINUED | OUTPATIENT
Start: 2022-01-02 | End: 2022-01-04 | Stop reason: HOSPADM

## 2022-01-02 RX ORDER — PROMETHAZINE HYDROCHLORIDE 25 MG/1
25 TABLET ORAL EVERY 6 HOURS PRN
Status: DISPENSED | OUTPATIENT
Start: 2022-01-02 | End: 2022-01-03

## 2022-01-02 RX ORDER — TRAZODONE HYDROCHLORIDE 50 MG/1
50 TABLET ORAL NIGHTLY PRN
Status: DISCONTINUED | OUTPATIENT
Start: 2022-01-02 | End: 2022-01-04 | Stop reason: HOSPADM

## 2022-01-02 RX ORDER — DICYCLOMINE HCL 20 MG
20 TABLET ORAL EVERY 6 HOURS PRN
Status: DISCONTINUED | OUTPATIENT
Start: 2022-01-02 | End: 2022-01-04 | Stop reason: HOSPADM

## 2022-01-02 RX ORDER — NICOTINE 21 MG/24HR
1 PATCH, TRANSDERMAL 24 HOURS TRANSDERMAL DAILY
Status: DISCONTINUED | OUTPATIENT
Start: 2022-01-02 | End: 2022-01-03

## 2022-01-02 RX ORDER — IBUPROFEN 400 MG/1
400 TABLET ORAL EVERY 6 HOURS PRN
Status: DISCONTINUED | OUTPATIENT
Start: 2022-01-02 | End: 2022-01-04 | Stop reason: HOSPADM

## 2022-01-02 RX ORDER — ACETAMINOPHEN 325 MG/1
650 TABLET ORAL EVERY 4 HOURS PRN
Status: DISCONTINUED | OUTPATIENT
Start: 2022-01-02 | End: 2022-01-04 | Stop reason: HOSPADM

## 2022-01-02 RX ORDER — BUPRENORPHINE AND NALOXONE 2; .5 MG/1; MG/1
1 FILM, SOLUBLE BUCCAL; SUBLINGUAL 2 TIMES DAILY
Status: DISCONTINUED | OUTPATIENT
Start: 2022-01-02 | End: 2022-01-03

## 2022-01-02 RX ORDER — MAGNESIUM HYDROXIDE/ALUMINUM HYDROXICE/SIMETHICONE 120; 1200; 1200 MG/30ML; MG/30ML; MG/30ML
30 SUSPENSION ORAL EVERY 6 HOURS PRN
Status: DISCONTINUED | OUTPATIENT
Start: 2022-01-02 | End: 2022-01-04 | Stop reason: HOSPADM

## 2022-01-02 RX ADMIN — SERTRALINE HYDROCHLORIDE 25 MG: 25 TABLET ORAL at 19:14

## 2022-01-02 RX ADMIN — PROMETHAZINE HYDROCHLORIDE 25 MG: 25 TABLET ORAL at 12:09

## 2022-01-02 RX ADMIN — HYDROXYZINE HYDROCHLORIDE 50 MG: 50 TABLET, FILM COATED ORAL at 01:01

## 2022-01-02 RX ADMIN — DICYCLOMINE HYDROCHLORIDE 20 MG: 20 TABLET ORAL at 12:09

## 2022-01-02 RX ADMIN — TRAZODONE HYDROCHLORIDE 50 MG: 50 TABLET ORAL at 01:01

## 2022-01-02 RX ADMIN — BUPRENORPHINE AND NALOXONE 1 FILM: 2; .5 FILM BUCCAL; SUBLINGUAL at 21:03

## 2022-01-02 RX ADMIN — TRAZODONE HYDROCHLORIDE 50 MG: 50 TABLET ORAL at 21:03

## 2022-01-02 RX ADMIN — HYDROXYZINE HYDROCHLORIDE 50 MG: 50 TABLET, FILM COATED ORAL at 21:03

## 2022-01-02 ASSESSMENT — SLEEP AND FATIGUE QUESTIONNAIRES
AVERAGE NUMBER OF SLEEP HOURS: 6
DO YOU HAVE DIFFICULTY SLEEPING: NO
DO YOU USE A SLEEP AID: NO

## 2022-01-02 ASSESSMENT — PAIN SCALES - GENERAL
PAINLEVEL_OUTOF10: 0
PAINLEVEL_OUTOF10: 0

## 2022-01-02 ASSESSMENT — PATIENT HEALTH QUESTIONNAIRE - PHQ9: SUM OF ALL RESPONSES TO PHQ QUESTIONS 1-9: 18

## 2022-01-02 ASSESSMENT — LIFESTYLE VARIABLES: HISTORY_ALCOHOL_USE: NO

## 2022-01-02 NOTE — ED NOTES
[] Jaspal    [] [address]    [x]  [facility] ASSESSMENT      Y  N     [x] [] In the past two weeks have you had thoughts of hurting yourself in any way? [x] [] In the past two weeks have you had thoughts that you would be better off dead? [] [x] Have you made a suicide attempt in the past two months? [x] [] Do you have a plan for hurting yourself or suicide? [] [x] Presence of hallucinations/voices related to hurting himself or herself or someone else. SUICIDE/SECURITY WATCH PRECAUTION CHECKLIST     Orders    [x]  Suicide/Security Watch Precautions initiated as checked below:   1/1/22 7:01 PM EST BH31/BH31C    [x] Notified physician:  Dhiraj Angulo MD  1/1/22 7:01 PM EST    [x] Orders obtained as appropriate:     [x] 1:1 Observer     [] Psych Consult     [] Psych Consult    Name:  Date:  Time:    [x] 1:1 Observer, Notified by:  Rigo cMcann RN    Contact Nurse Supervisor    [x] Remove all personal clothes from room and place in snap/paper gown/pants. Slipper only    [x] Remove all personal belongings from room and secured away from patient. Documentation    [x] Initiate Suicide/Security Watch Precaution Flow Sheet    [x] Initiate individualized Care Plan/Problem    [x] Document why precautions initiated on flow sheet (Initiate Nursing Care Plan/Problem)    [x] 1:1 Observer in place; instructions provided. Suicide precautions require observer be within arms length. [x] Nurse-Observer Communication Hand-off initiated by RN, reviewed with Observer. Subsequently used as Hand Off between Observers. [x] Initiate every 15 minute observations per observer as delegated by the RN.     [x] Initiate RN assessment and documentation    Environmental Scan  Search Criteria and Process: OPTIONAL, see Search Policy    [] Reason for search:    [] Nursing in presence of second person to search patient    [] Patient notified of reason for body assessment and belongings search:     Persons present during search:   Results of search and disposition:       Searchers Name: security    These items or items similar should be removed from the room:   [] Chairs   [] Telephone   [] Trash cans and liners   [] Plastic utensils (order Patient Safety tray)   [] Empty or remove Sharps containers   [] All personal clothing/belongings removed   [] All unnecessary lead wires, electrical cords, draw cords, etc.   [] Flowers and plants   [] Double check for lighters, matches, razors, any glass items etc that can be used as weapons. Person completing Checklist: Jeanette Reese RN       GENERAL INFORMATION     Y  N     [x] [] Has the patient been informed that they are on a watch and what that means? [x] [] Can the patient get out of Bed without nursing assistance? [x] [] Can the patient use the restroom without nursing assistance? [x] [] Can the patient walk the halls to [place] their legs? \"   [] [x] Does the patient have metal utensils? [x] [] Have the patient's belongings been placed out of control of the patient? [x] [] Have the patient and his/her belongings been checked for contraband? [] [x] Is the patient under any visitor restrictions? If Yes, explain:   [] [x] Is the patient under an alias? Alias Name:   Authorized visitors (no more than two are to be on the list)   Name/Relationship:   Name/Relationship:    Name of Staff member that you  Received this information from?: security  General Description:    [address] male 27 y.o. Admission weight: 250 lb (113.4 kg) Height: 5' 11\" (180.3 cm)  Race: []  [x] Black  []   []   [] [place] [] Other  Facial Hair:  [x] Yes  [] No  If yes, please describe: Identifying Marks (i.e. Visible tattoos, scars, etc... ):     NURSING CARE PLAN    Nursing Diagnosis: Risk of Self Directed Harm  [] Actual  [x] Potential  Date Started: 1/1/22      Etiological Factors: (related to)  [x] Expressed or implied suicidal ideation/behavior  [] Depression  [] Suicide attempt      [] Low self-esteem  [] Hallucinations      [] Feeling of Hopelessness  [x] Substance abuse or withdrawal    [] Dysfunctional family  [] Major traumatic event, eg., divorce, etc   [] Excessive stress/anxiety    1/1/22    Expected Outcomes    Patient will:   [x] Patient will remain safe for the duration of their stay   [x] Patient's environment will be safe, eg. Free of potential suicide weapons   [] Verbalize Recovery from suicidal episode and improvement in self-worth   [x] Discuss feeling that precipitated suicide attempt/thoughts/behavior   [] Will describe available resources for crisis prevention and management   [] Will verbalize positive coping skills     Nursing Intervention   [x] Assessment and Observations hourly   [x] Suicide Precautions implemented with patient, should be 1:1 observation   [x] Document observation [id number] and RN assessment hourly   [] Consult physician for:    [] Psychiatric consult    [] Pharmacological therapy    [] Other:    [x] Patient search completed by security   [x] Initiated appropriate safety protocols by removing from the patient's environment anything that could be used to inflict self injury, eg. Order safe tray, snap gown, etc   [x] Maintain open, warm, caring, non-judgmental attitude/manner towards patient   [] Discuss advantages and disadvantages of existing coping methods/skills   [x] Assist and educate patient with identifying present strengths and coping skills   [x] Keep patient informed regarding plan of care and provide clear concise explanations. Provide the patient/family education information as well as telephone numbers and other information about crisis centers, hot lines, and counselors.     Discharge Planning:   [] Referral  [] Groups [] Health agencies  [] Other:          Deandre Gregory RN  01/01/22 4503

## 2022-01-02 NOTE — ED NOTES
Reviewed pt case with on call psychiatrist who finds that pt meets criteria for inpatient psychiatric admission. Pt to be admitted to the [facility] under the care of Dr. Jayla Andres with a diagnosis of depression with suicidal ideations.       ARIADNA Pfeiffer  01/01/22 2049

## 2022-01-02 NOTE — BH NOTE
[facility]  Admission Note     Admission Type:   Admission Type: Voluntary    Reason for admission:  Reason for Admission: Suicidal thoughts to stab self due to drug use    PATIENT STRENGTHS:  Strengths: Positive Support,Motivated,No significant Physical Illness    Patient Strengths and Limitations:  Limitations: Inappropriate/potentially harmful leisure interests    Addictive Behavior:   Addictive Behavior  In the past 3 months, have you felt or has someone told you that you have a problem with:  : None  Do you have a history of Chemical Use?: No  Do you have a history of Alcohol Use?: No  Do you have a history of Street Drug Abuse?: Yes  Histroy of Prescripton Drug Abuse?: No    Medical Problems:   History reviewed. No pertinent past medical history. Status EXAM:  Status and Exam  Normal: No  Facial Expression: Flat  Affect: Appropriate  Level of Consciousness: Alert  Mood:Normal: No  Mood: Depressed,Anhedonia,Empty  Motor Activity:Normal: Yes  Interview Behavior: Cooperative  Attention:Normal: Yes  Thought Content:Normal: Yes  Hallucinations: None  Delusions: No  Memory:Normal: Yes  Insight and Judgment: Yes  Present Suicidal Ideation: No  Present Homicidal Ideation: No    Tobacco Screening:  Practical Counseling, on admission, karri X, if applicable and completed (first 3 are required if patient doesn't refuse):             (x )  Recognizing danger situations (included triggers and roadblocks)                    (x )  Coping skills (new ways to manage stress, exercise, relaxation techniques, changing routine, distraction)                                                           ( x)  Basic information about quitting (benefits of quitting, techniques in how to quit, available resources  ( ) Referral for counseling faxed to [facility]                                           ( ) Patient refused counseling  ( ) Patient has not smoked in the last 30 days    Metabolic Screening:    No results found for: LABA1C    No results found for: CHOL  No results found for: TRIG  No results found for: HDL  No components found for: LDLCAL  No results found for: LABVLDL      Body mass index is 34.87 kg/m². BP Readings from Last 2 Encounters:   01/02/22 131/85   01/01/22 131/77           Pt admitted with followings belongings:  Dental Appliances: None  Vision - Corrective Lenses: None  Hearing Aid: None  Jewelry: Bracelet  Body Piercings Removed: N/A  Clothing: Jearld Knowles / coat,Pants,Shirt,Socks  Were All Patient Medications Collected?: Not Applicable  Other Valuables: Other (Comment) (see belonging sheet)     Patient's home medications were n/a. Patient oriented to surroundings and program expectations and copy of patient rights given. Received admission packet:  yes. Consents reviewed, signed consents. Refused nothing. Patient verbalize understanding:  yes.   Patient education on precautions: yes                   Sebastian Hillman RN

## 2022-01-02 NOTE — ED PROVIDER NOTES
[facility]     Emergency Department     Faculty Attestation    I performed a history and physical examination of the patient and discussed management with the resident. I reviewed the residents note and agree with the documented findings and plan of care. Any areas of disagreement are noted on the chart. I was personally present for the key portions of any procedures. I have documented in the chart those procedures where I was not present during the key portions. I have reviewed the emergency nurses triage note. I agree with the chief complaint, past medical history, past surgical history, allergies, medications, social and family history as documented unless otherwise noted below. For Physician Assistant/ Nurse Practitioner cases/documentation I have personally evaluated this patient and have completed at least one if not all key elements of the E/M (history, physical exam, and MDM). Additional findings are as noted. I have personally seen and evaluated the patient. I find the patient's history and physical exam are consistent with the NP/PA documentation. I agree with the care provided, treatment rendered, disposition and follow-up plan. 78-year-old male presenting with suicidal ideation, with plan to stab himself. Was planning to harm himself today when he called a family member, who convinced him to come to the ER. He has also recently decided to stop using fentanyl, and is starting to experience withdrawal symptoms. He describes his withdrawal symptoms as a \"rumbling stomach\". No tachycardia, tachypnea, or tremors.     Exam:  General: Sitting on the bed, awake, alert and in no acute distress  CV: normal rate and regular rhythm  Lungs: Breathing comfortably on room air with no tachypnea, hypoxia, or increased work of breathing  Abdomen: soft, non-tender, non-distended    Plan:  Labs per [facility] protocol  Social work to collaborate with on-call psychiatrist to determine appropriate level of care for suicidal ideation        Dayami Langford MD   Attending Emergency  Physician    (Please note that portions of this note were completed with a voice recognition program. Efforts were made to edit the dictations but occasionally words are mis-transcribed.)              Dayami Langford MD  01/02/22 7830

## 2022-01-02 NOTE — CARE COORDINATION
Psychosocial Assessment    Current Level of Psychosocial Functioning      Independent  X  Dependent    Minimal Assist     Comments:      Psychosocial High Risk Factors (check all that apply)    Unable to obtain meds   Chronic illness/pain    Substance abuse  X  Lack of Family Support  X  Financial stress   Isolation   Inadequate Community Resources  Suicide attempt(s)  X  Not taking medications   Victim of crime   Developmental Delay  Unable to manage personal needs    Age 72 or older   Homeless  No transportation   Readmission within 30 days  Unemployment  Traumatic Event    Family/Supports identified: Pt reports father is supportive. Sexual Orientation:   NA    Patient Strengths:     Patient Barriers: Homeless, substance abuse. Safety plan: NA    CMHC/MH history: [facility]. Pt has past experience with [facility] and [facility] 29 of Care:  medication management, group/individual therapies, family meetings, psycho -education, treatment team meetings to assist with stabilization    Initial Discharge Plan: To be determined by the doctor. Clinical Summary:  Pt is a 27year old male admitted to the [facility] for safety. Pt denies suicidal, homicidal ideations and hallucinations. Pt shared \"My dad told me to come here and say I wanted to hurt myself so I could get clean. \" Pt reports last use of fentanyl as 20.00 dollars worth on 1/1/2022 and has been using for about 1 year. Pt reports past attempt of suicide, and [facility]. Pt denies any physical, emotional, verbal or sexual trauma. Pt was alert and cooperative during assessment.

## 2022-01-02 NOTE — ED PROVIDER NOTES
[address] HANDOFF       Handoff taken on the following patient from prior Attending Physician:Dr. Reji Kim  Pt Name: Ana Mendez  PCP:  No primary care provider on file. Attestation  I was available and discussed any additional care issues that arose and coordinated the management plans with the resident(s) caring for the patient during my duty period. Any areas of disagreement with resident's documentation of care or procedures are noted on the chart. I was personally present for the key portions of any/all procedures during my duty period. I have documented in the chart those procedures where I was not present during the key portions. CHIEF COMPLAINT       Chief Complaint   Patient presents with    Suicidal    Withdrawal         CURRENT MEDICATIONS     Previous Medications  Previous Medications    No medications on file       Encounter Medications  No orders of the defined types were placed in this encounter. ALLERGIES     has No Known Allergies.       RECENT VITALS:   Temp: 97.2 °F (36.2 °C),  Pulse: 85, Resp: 16, BP: 131/77    RADIOLOGY:   No orders to display       LABS:  Labs Reviewed   COVID-19, RAPID - Abnormal; Notable for the following components:       Result Value    SARS-CoV-2, Rapid DETECTED (*)     All other components within normal limits   CBC WITH AUTO DIFFERENTIAL - Abnormal; Notable for the following components:    RDW 11.6 (*)     All other components within normal limits   COMPREHENSIVE METABOLIC PANEL - Abnormal; Notable for the following components:    BUN 5 (*)     All other components within normal limits   TOX SCR, BLD, ED - Abnormal; Notable for the following components:    Acetaminophen Level <5 (*)     Salicylate Lvl <1 (*)     All other components within normal limits   URINE DRUG SCREEN - Abnormal; Notable for the following components:    Cocaine Metabolite, Urine POSITIVE (*)     Cannabinoid Scrn, Ur POSITIVE (*)     All other components within normal limits           PLAN/ TASKS OUTSTANDING     Pt with suicidal thoughts about stabbing himself. Pt here for help. Will plan for transfer to [facility]. IF pt decides he does not want to go to [facility], recommend pink slip given his significant risk of harm to self.        (Please note that portions of this note were completed with a voice recognition program.  Efforts were made to edit the dictations but occasionally words are mis-transcribed.)    Missy Baires MD, MD,   Attending Emergency Physician       Missy Baires MD  01/01/22 9027

## 2022-01-02 NOTE — ED NOTES
Writer met with patient at bedside regarding concerns of substance use and suicidal ideations. Patient is alert, oriented, calm and cooperative. Patient reports that he has been a daily Fentanyl user for the past year. He reports that he uses $100 a day via inhalation. Patient states that he has been trying to stop on his own but has not been successful. Patient reports he typically starts to have withdraw symptoms 8-12 hours after his last use. He last used around 9am and states that he is starting to feel nauseas. Typically he reports withdraw symptoms of nausea, diarrhea, hot and cold chills, anxiety, runny nose. 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 Patient states he smoke marijuana occasionally and denies other substance use concerns. Patient states that he has not been in treatment before for his substance use and is seeking inpatient treatment tonight. Patient additionally reports suicidal ideations with an earlier thought to stab himself. Patient states that he called his father and father brought patient to the ED for help. Patient states that he does have a history of depression with a previous suicide attempt by overdose several years ago. He reports that today, his suicidal ideations are a result of his inability to stop using drugs. Patient is seeking assistance with inpatient treatment for both substance use detox and mental health.           Derotha Cheadle, LISW  01/01/22 1922

## 2022-01-02 NOTE — PLAN OF CARE
Problem: Gas Exchange - Impaired  Goal: Absence of hypoxia  1/2/2022 1429 by Carlos Hamilton RN  Outcome: Ongoing  Note: Patient's oxygenation is WNL. 1/2/2022 0833 by SANJAY Fletcher  Outcome: Ongoing     Problem: Body Temperature -  Risk of, Imbalanced  Goal: Ability to maintain a body temperature within defined limits  1/2/2022 1429 by Carlos Hamilton RN  Outcome: Ongoing  Note: Body temperature is WNL. 1/2/2022 1959 by SANJAY Fletcher  Outcome: Ongoing     Problem: Isolation Precautions - Risk of Spread of Infection  Goal: Prevent transmission of infection  1/2/2022 1429 by Carlos Hamilton RN  Outcome: Ongoing  Note: Isolation precautions are maintained as ordered. 1/2/2022 9827 by SANJAY Fletcher  Outcome: Ongoing     Problem: Fatigue  Goal: Verbalize increase energy and improved vitality  1/2/2022 1429 by Carlos Hamilton RN  Outcome: Ongoing  1/2/2022 0833 by SANJAY Fltecher  Outcome: Ongoing     Problem: Altered Mood, Depressive Behavior:  Goal: Able to verbalize and/or display a decrease in depressive symptoms  Description: Able to verbalize and/or display a decrease in depressive symptoms  1/2/2022 1429 by Carlos Hamilton RN  Outcome: Ongoing  Note: Patient endorses fleeting suicidal ideations. Patient reports that he feels safe on the unit. Patient reports depression and anxiety symptoms. Patient has a poor appetite this shift. His sleeping is fair. Patient remains in behavior control thus far, this shift. Visual safety checks are completed every 15 minutes and randomly.    1/2/2022 6987 by SANJAY Fletcher  Outcome: Ongoing  Goal: Ability to disclose and discuss suicidal ideas will improve  Description: Ability to disclose and discuss suicidal ideas will improve  1/2/2022 1429 by Carlos Hamilton RN  Outcome: Ongoing  1/2/2022 0833 by SANJAY Fletcher  Outcome: Ongoing  Goal: Absence of self-harm  Description: Absence of self-harm  1/2/2022 1429 by Carlos Hamilton RN  Outcome: Ongoing  1/2/2022 0833 by

## 2022-01-02 NOTE — PROGRESS NOTES
Behavioral Services  Medicare Certification Upon Admission    I certify that this patient's inpatient psychiatric hospital admission is medically necessary for:    [x] (1) Treatment which could reasonably be expected to improve this patient's condition,       [x] (2) Or for diagnostic study;     AND     [x](2) The inpatient psychiatric services are provided while the individual is under the care of a physician and are included in the individualized plan of care.     Estimated length of stay/service -3 to 6 days    Plan for post-hospital care -outpatient care    Electronically signed by Roxy Giang MD on 1/2/2022 at 6:30 PM

## 2022-01-02 NOTE — H&P
Department of Psychiatry  Attending Physician Psychiatric Assessment     Reason for Admission to Psychiatric Unit:  Concerns about patient's safety in the community    CHIEF COMPLAINT: Suicidal thoughts to stab self with a knife. History obtained from: Patient, electronic medical record          HISTORY OF PRESENT ILLNESS:    Mellisa Jacobs is a 27 y.o. male who has a past medical history of impaired gastric change, body temperature risk of imbalance, isolation precautions risk of spread of infection, fatigue, altered mood depressive behavior, substance abuse, and tobacco use. Problems from outside sources in EMR indicate: C/O overdose, nicotine dependence cigarettes uncomplicated, poisoning by oth opioids accidental (unintentional) init, schizophrenia paranoid. Per ED provider documentation:    \"Subha Lugo is a 27 y.o. male who presents with 3 days progressively worsening suicidal ideation with intent to stab himself with a knife. Patient does have access to a knife and was about to do so today when he spoke with his father who convinced him to seek treatment. Patient has a history of fentanyl abuse, last used earlier today but would like to become sober. No medical concerns or complaints\". Per ED  documentation:    \"Writer met with patient at bedside regarding concerns of substance use and suicidal ideations. Patient is alert, oriented, calm and cooperative. Patient reports that he has been a daily Fentanyl user for the past year. He reports that he uses $100 a day via inhalation. Patient states that he has been trying to stop on his own but has not been successful. Patient reports he typically starts to have withdraw symptoms 8-12 hours after his last use. He last used around 9am and states that he is starting to feel nauseas. Typically he reports withdraw symptoms of nausea, diarrhea, hot and cold chills, anxiety, runny nose.   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 Patient states he smoke marijuana occasionally and denies other substance use concerns. Patient states that he has not been in treatment before for his substance use and is seeking inpatient treatment tonight.       Patient additionally reports suicidal ideations with an earlier thought to stab himself. Patient states that he called his father and father brought patient to the ED for help. Patient states that he does have a history of depression with a previous suicide attempt by overdose several years ago. He reports that today, his suicidal ideations are a result of his inability to stop using drugs.      Patient is seeking assistance with inpatient treatment for both substance use detox and mental health\". Today:    Patient was agreeable to speaking with writer today in the day area. Patient reports he was having suicidal ideation to stab himself with a knife. He reports the suicidal ideation was linked to fentanyl use. Patient reports he called his dad and reports his dad took him to [facility]. Patient then reports he was taken to [facility]. He identifies his stressor as drug use. Patient endorses having depression for all his life. Patient reports his depression has worsened for the last couple of months. Patient endorses anhedonia. Patient reports feeling helpless, hopeless, and worthless. Patient endorses poor energy and poor concentration. Patient currently endorses fleeting suicidal ideation without intent or plans. Patient contracts for safety on the unit. Patient reports he would be unsafe off the unit. Patient denies homicidal ideation, intent, or plans. Patient endorses anxiety. Patient endorses panic attacks in the past.  Patient denies meera/hypomania symptoms. However, patient endorses impulsivity. He also reports a history of bipolar. He denies phobias. Denies obsessions or compulsions. Denies body or vocal tics. Patient currently denies visual hallucinations, but endorses visual hallucinations while on drugs. Patient reports he was seeing\" things that probably wasn't there\". He denies auditory hallucinations. He denies paranoia. Patient presents with delusions and reports he feels he can read other people's thoughts. Patient also reports he feels the media communicates directly with him. Patient endorses PTSD and endorses nightmares and flashbacks. He endorses a history of emotional trauma. Patient endorses decreased appetite. He denies any changes in his weight. He denies history of eating disorders. He reports his sleep has been fair. He reports getting on average 2 to 3 hours of sleep. He endorses difficulty falling asleep and difficulty staying asleep. He endorses history of head trauma and reports he has had concussions in the past.  Denies history of seizures. Endorses history of violence/aggression. Endorses history of self-injurious behaviors and reports cutting in the past.    When asked about medical concerns, patient endorses stomach pain and describes it as achy and sharp. He rates the pain at a 8 out of 10 (0-10 scale with zero being no pain and 10 being worst). Patient also endorses lower back pain which she reports is new and describes the pain as achy. He rates the pain as 8 out of 10 (0-10 scale with zero being no pain and 10 being worst). He denies any other medical concerns at the time of assessment. Patient reports he does not currently follow with any psychiatric services or providers. He endorses one lifetime suicide attempt a couple years ago and reports he overdosed on Tylenol. Patient denies any previous psychiatric hospital admissions and reports this is currently his first psychiatric hospital admission. He denies taking any past psychiatric medications and reports he has no home medications.     Patient endorses cigarette use and reports he smokes a pack a day when he can afford it. Patient endorses cannabis use and reports he smokes cannabis at least two times a week and states \"a relo\". Patient reports he drinks alcohol socially. Patient reports he snorts cocaine and states \"when I can get it\". Patient reports heroin use yesterday and reports he snorted it, but reports he is unsure of the amount. Patient also endorses fentanyl use yesterday and reports he snorted it. He reports daily fentanyl use. Patient endorses withdrawal symptoms and reports back pain and stomach pain. Urine drug screen results from 1/1/2022 was positive for cannabinoid screening and cocaine metabolite. Patient requires inpatient hospitalization at this time for safety and stability. Patient would benefit from active treatment furnished directly by or requiring the supervision of inpatient psychiatric personnel. History of head trauma: [x] Yes [] No   He endorses history of head trauma and reports he has had concussions in the past.    History of seizures: [] Yes [x] No    History of violence or aggression: [x] Yes [] No         PSYCHIATRIC HISTORY:  [x] Yes [] No  Patient reports history of bipolar, but denies meera/hypomania symptoms. Endorses impulsivity. Patient reports he does not currently follow with any psychiatric services or providers. He endorses one lifetime suicide attempt a couple years ago and reports he overdosed on Tylenol. Patient denies any previous psychiatric hospital admissions and reports this is currently his first psychiatric hospital admission. Home Medication Compliance: [x] Yes [] No   He denies taking any past psychiatric medications and reports he has no home medications. Past psychiatric medications includes: Patient reports he has not been prescribed any psychiatric medications. Adverse reactions from psychotropic medications: Patient reports he has not been prescribed any psychiatric medications. Lifetime Psychiatric Review of Systems         Depression: Endorses     Anxiety: Endorses     Panic Attacks: Endorses in the past.     Gisel or Hypomania: Patient reports history of bipolar, but denies gisel/hypomania symptoms. Endorses impulsivity. Phobias: Denies     Obsessions and Compulsions: Denies     Visual Hallucinations: Patient currently denies visual hallucinations, but endorses visual hallucinations while on drugs. Patient reports he was seeing\" things that probably wasn't there\". Auditory Hallucinations: Denies     Delusions: Endorses     Paranoia: Denies     PTSD: Endorses    Past Medical History:    History reviewed. No pertinent past medical history. Past Surgical History:    History reviewed. No pertinent surgical history. Allergies:  Patient has no known allergies. Social History:     Born in: [facility]. Family: Reports growing up with his dad. Reports he has two other brothers and one sister. Highest Level of Education: 11th grade. Occupation: Unemployed. Reports no source of income. Marital Status: Single, never . Children: No children. Residence: Reports he lives at [facility] houses. Stressors: He identifies his stressor as drug use. Patient Assets/Supportive Factors: Dad and friend. DRUG USE HISTORY  Social History     Tobacco Use   Smoking Status Current Every Day Smoker    Packs/day: 0.50    Types: Cigarettes   Smokeless Tobacco Never Used     Social History     Substance and Sexual Activity   Alcohol Use Not Currently    Comment: social      Social History     Substance and Sexual Activity   Drug Use Yes    Types: IV    Comment: snorts Fentanyl daily     Patient endorses cigarette use and reports he smokes a pack a day when he can afford it. Patient endorses cannabis use and reports he smokes cannabis at least two times a week and states \"a relo\". Patient reports he drinks alcohol socially.   Patient reports he snorts cocaine and states \"when I can get it\". Patient reports heroin use yesterday and reports he snorted it, but reports he is unsure of the amount. Patient also endorses fentanyl use yesterday and reports he snorted it. He reports daily fentanyl use. Patient endorses withdrawal symptoms and reports back pain and stomach pain. Urine drug screen results from 1/1/2022 was positive for cannabinoid screening and cocaine metabolite. LEGAL HISTORY:   HISTORY OF INCARCERATION: [x] Yes [] No   Patient reports he was in half-way for robbery. Family History:   History reviewed. No pertinent family history. Psychiatric Family History    Patient denies psychiatric family history. Suicides in family: [] Yes [x] No    Substance use in family: [x] Yes [] No  Patient endorses substance use in family, but does not further elaborate. PHYSICAL EXAM:  Vitals:  /85   Pulse 73   Temp 97.4 °F (36.3 °C) (Oral)   Resp 14   Ht 5' 11\" (1.803 m)   Wt 250 lb (113.4 kg)   BMI 34.87 kg/m²     Pain Level: Patient endorses stomach pain and describes it as achy and sharp. He rates the pain at a 8 out of 10 (0-10 scale with zero being no pain and 10 being worst). Patient also endorses lower back pain which she reports is new and describes the pain as achy. He rates the pain as 8 out of 10 (0-10 scale with zero being no pain and 10 being worst). LABS:  Labs reviewed: [x] Yes  Last EKG in EMR reviewed: [x] Yes  QTc: 453. Review of Systems   Constitutional: Negative for chills and weight loss. HENT: Negative for ear pain and nosebleeds. Eyes: Negative for blurred vision and photophobia. Respiratory: Negative for cough, shortness of breath and wheezing. Cardiovascular: Negative for chest pain and palpitations. Gastrointestinal: Negative for diarrhea and vomiting. Positive for reported abdominal pain. Positive for reported stomach pain. Genitourinary: Negative for dysuria and urgency. Musculoskeletal: Negative for falls and joint pain. Skin: Negative for itching and rash. Neurological: Negative for tremors, seizures and weakness. Endo/Heme/Allergies: Does not bruise/bleed easily. Physical Exam:   Constitutional:  Appears well-developed and well-nourished, no acute distress. HENT:   Head: Normocephalic and atraumatic. Eyes: Conjunctivae are normal. Right eye exhibits no discharge. Left eye exhibits no discharge. No scleral icterus. Neck: Normal range of motion. Neck supple. Pulmonary/Chest:  No respiratory distress or accessory muscle use, no wheezing. Cardiac: Regular rate and rhythm. Abdominal: Soft. Non-tender. Exhibits no distension. Musculoskeletal: Normal range of motion. Exhibits no edema. Neurological: cranial nerves II-XII grossly in tact, normal gait and station. Skin: Skin is warm and dry. Patient is not diaphoretic. No erythema. Mental Status Examination:    Level of consciousness: Awake and alert  Appearance:  Appropriate attire, visible tattoos, seated in chair, fair grooming   Behavior/Motor: Approachable, no psychomotor abnormalities noted  Attitude toward examiner:  Cooperative, attentive, good eye contact  Speech: Normal rate, volume, and tone. Mood: \"Sad\"  Affect:  Depressed. Thought processes: Linear and coherent  Thought content: Fleeting suicidal ideations, without current plan or intent, contracts for safety on the unit. Denies homicidal ideations               Patient currently denies visual hallucinations, but endorses visual hallucinations while on drugs. Patient reports he was seeing\" things that probably wasn't there\". Denies auditory hallucinations.               Endorses delusions              Denies paranoia  Cognition:  Oriented to self, location, time, situation  Concentration: Clinically adequate  Memory: Intact  Insight &Judgment: Poor         DSM-5 Diagnosis    Principal Problem: Major depressive disorder, single loose stool. Risk Management: close watch per standard protocol      Psychotherapy: participation in milieu and group and individual sessions with Attending Physician,  and Physician Assistant/CNP      Estimated length of stay:  2-14 days      GENERAL PATIENT/FAMILY EDUCATION  Patient will understand basic signs and symptoms, patient will understand benefits/risks and potential side effects from proposed medications, and patient will understand their role in recovery. Family is active in patient's care. Patient assets that may be helpful during treatment include: Intent to participate and engage in treatment, sufficient fund of knowledge and intellect to understand and utilize treatments. Goals:    1) Remission of depression, suicidal ideation, anxiety, panic attacks, delusions, PTSD, and previously reported visual hallucinations. 2) Stabilization of symptoms prior to discharge. 3) Establish efficacy and tolerability of medications. Behavioral Services  Medicare Certification     Admission Day 1  I certify that this patient's inpatient psychiatric hospital admission is medically necessary for:    x (1) treatment which could reasonably be expected to improve this patient's condition, or    x (2) diagnostic study or its equivalent. Time Spent: 60 minutes. Bari Schwab is a 27 y.o. male being evaluated face to face    --MANSOOR Birmingham CNP on 1/2/2022 at 9:00 AM    An electronic signature was used to authenticate this note. I independently saw and evaluated the patient. I reviewed the midlevel provider's documentation above. Any additional comments or changes to the midlevel provider's documentation are stated below otherwise agree with assessment. The patient was seen face-to-face. He was admitted after presenting with suicidal ideation with a plan to stop himself. He told me that he has been using fentanyl. He gets withdrawal and is unable to cope.   The patient tested positive for Covid in ER and is not eligible for residential rehab because of his Covid positive status. The patient denies any auditory or visual hallucinations. He denies any psychotic phenomena. His urine drug screen is positive for cannabinoids and cocaine. He states he has been using fentanyl. The patient has started with Percocets. He has been using continuously and has not had any sobriety. He has also not sought any treatment for his drug use. We will start the patient on Suboxone 2 mg twice a day and Zoloft 25 mg daily. PLAN  Medications as noted above  Attempt to develop insight  Psycho-education conducted. Supportive Therapy conducted.   Probable discharge is 3-9 days  Follow-up daily while on inpatient unit    Electronically signed by Jael Guzman MD on 1/2/22 at 6:31 PM EST

## 2022-01-02 NOTE — ED PROVIDER NOTES
[facility]  ED  Emergency Department Encounter  Emergency Medicine Resident     Pt Name:Subha Hewitt  MRN: [id number]  [place] 1995  Date of evaluation: 1/1/22  PCP:  No primary care provider on file. CHIEF COMPLAINT       Chief Complaint   Patient presents with    Suicidal    Withdrawal       HISTORY OF PRESENT ILLNESS  (Location/Symptom, Timing/Onset, Context/Setting, Quality, Duration, Modifying Factors, Severity.)      Irvin Sotelo is a 27 y.o. male who presents with 3 days progressively worsening suicidal ideation with intent to stab himself with a knife. Patient does have access to a knife and was about to do so today when he spoke with his father who convinced him to seek treatment. Patient has a history of fentanyl abuse, last used earlier today but would like to become sober. No medical concerns or complaints. PAST MEDICAL / SURGICAL / SOCIAL / FAMILY HISTORY      has no past medical history on file. has no past surgical history on file.     Social History     Socioeconomic History    Marital status: Single     Spouse name: Not on file    Number of children: Not on file    Years of education: Not on file    Highest education level: Not on file   Occupational History    Not on file   Tobacco Use    Smoking status: Current Every Day Smoker     Packs/day: 0.50     Types: Cigarettes    Smokeless tobacco: Never Used   Vaping Use    Vaping Use: Never used   Substance and Sexual Activity    Alcohol use: Not Currently     Comment: social     Drug use: Not Currently     Types: Marijuana Veldon Bunting)    Sexual activity: Not on file   Other Topics Concern    Not on file   Social History Narrative    Not on file     Social Determinants of Health     Financial Resource Strain:     Difficulty of Paying Living Expenses: Not on file   Food Insecurity:     Worried About Running Out of Food in the Last Year: Not on file    Christopher of Food in the Last Year: Not on file   Transportation Needs:     Lack of Transportation (Medical): Not on file    Lack of Transportation (Non-Medical): Not on file   Physical Activity:     Days of Exercise per Week: Not on file    Minutes of Exercise per Session: Not on file   Stress:     Feeling of Stress : Not on file   Social Connections:     Frequency of Communication with Friends and Family: Not on file    Frequency of Social Gatherings with Friends and Family: Not on file    Attends Christian Services: Not on file    Active Member of [address] [place] [address] [employer] or Organizations: Not on file    Attends Club or Organization Meetings: Not on file    Marital Status: Not on file   Intimate Partner Violence:     Fear of Current or Ex-Partner: Not on file    Emotionally Abused: Not on file    Physically Abused: Not on file    Sexually Abused: Not on file   Housing Stability:     Unable to Pay for Housing in the Last Year: Not on file    Number of [place] in the Last Year: Not on file    Unstable Housing in the Last Year: Not on file       History reviewed. No pertinent family history. Allergies:  Patient has no known allergies. Home Medications:  Prior to Admission medications    Not on File       REVIEW OF SYSTEMS    (2-9 systems for level 4, 10 or more for level 5)      Review of Systems   Constitutional: Negative for fever. HENT: Negative for sore throat. Eyes: Negative for visual disturbance. Respiratory: Negative for shortness of breath. Cardiovascular: Negative for chest pain. Gastrointestinal: Negative for abdominal pain, constipation, diarrhea, nausea and vomiting. Genitourinary: Negative for decreased urine volume. Musculoskeletal: Negative for arthralgias and myalgias. Skin: Negative for wound. Neurological: Negative for weakness, light-headedness and headaches. Psychiatric/Behavioral: Positive for suicidal ideas. Negative for confusion.        PHYSICAL EXAM   (up to 7 for level 4, 8 or more for level 5) INITIAL VITALS:   /77   Pulse 85   Temp 97.2 °F (36.2 °C)   Resp 16   Ht 5' 11\" (1.803 m)   Wt 250 lb (113.4 kg)   SpO2 100%   BMI 34.87 kg/m²     Physical Exam  Vitals and nursing note reviewed. Constitutional:       Appearance: Normal appearance. He is well-developed. HENT:      Head: Normocephalic and atraumatic. Right Ear: External ear normal.      Left Ear: External ear normal.      Nose: Nose normal.   Neck:      Trachea: Trachea normal. No tracheal deviation. Cardiovascular:      Rate and Rhythm: Normal rate and regular rhythm. Pulmonary:      Effort: Pulmonary effort is normal. No respiratory distress. Abdominal:      Palpations: Abdomen is soft. Tenderness: There is no abdominal tenderness. Musculoskeletal:         General: No deformity. Normal range of motion. Cervical back: Normal range of motion. No rigidity. Skin:     General: Skin is warm and dry. Capillary Refill: Capillary refill takes less than 2 seconds. Neurological:      General: No focal deficit present. Mental Status: He is alert and oriented to person, place, and time. Psychiatric:         Mood and Affect: Mood normal.         Behavior: Behavior normal.         Thought Content: Thought content includes suicidal ideation. Thought content does not include homicidal ideation. Thought content includes suicidal plan. Thought content does not include homicidal plan. DIFFERENTIAL  DIAGNOSIS     PLAN (LABS / IMAGING / EKG):  Orders Placed This Encounter   Procedures    COVID-19, Rapid    CBC WITH AUTO DIFFERENTIAL    COMPREHENSIVE METABOLIC PANEL    TOX SCR, BLD, ED    DRUG SCREEN MULTI URINE       MEDICATIONS ORDERED:  No orders of the defined types were placed in this encounter.       DDX: Suicidal ideation    DIAGNOSTIC RESULTS / EMERGENCY DEPARTMENT COURSE / MDM     LABS:  Results for orders placed or performed during the hospital encounter of 01/01/22   COVID-19, Rapid Specimen: Nasopharyngeal Swab   Result Value Ref Range    Specimen Description . NASOPHARYNGEAL SWAB     SARS-CoV-2, Rapid DETECTED (A) Not Detected   CBC WITH AUTO DIFFERENTIAL   Result Value Ref Range    WBC 4.3 3.5 - 11.3 k/uL    RBC 5.01 4.21 - 5.77 m/uL    Hemoglobin 15.1 13.0 - 17.0 g/dL    Hematocrit 45.0 40.7 - 50.3 %    MCV 89.8 82.6 - 102.9 fL    MCH 30.1 25.2 - 33.5 pg    MCHC 33.6 28.4 - 34.8 g/dL    RDW 11.6 (L) 11.8 - 14.4 %    Platelets 027 648 - 880 k/uL    MPV 9.3 8.1 - 13.5 fL    NRBC Automated 0.0 0.0 per 100 WBC    Differential Type NOT REPORTED     Seg Neutrophils 55 36 - 65 %    Lymphocytes 37 24 - 43 %    Monocytes 7 3 - 12 %    Eosinophils % 1 1 - 4 %    Basophils 0 0 - 2 %    Immature Granulocytes 0 0 %    Segs Absolute 2.38 1.50 - 8.10 k/uL    Absolute Lymph # 1.61 1.10 - 3.70 k/uL    Absolute Mono # 0.32 0.10 - 1.20 k/uL    Absolute Eos # <0.03 0.00 - 0.44 k/uL    Basophils Absolute <0.03 0.00 - 0.20 k/uL    Absolute Immature Granulocyte <0.03 0.00 - 0.30 k/uL    WBC Morphology NOT REPORTED     RBC Morphology NOT REPORTED     Platelet Estimate NOT REPORTED    COMPREHENSIVE METABOLIC PANEL   Result Value Ref Range    Glucose 90 70 - 99 mg/dL    BUN 5 (L) 6 - 20 mg/dL    CREATININE 0.79 0.70 - 1.20 mg/dL    Bun/Cre Ratio NOT REPORTED 9 - 20    Calcium 9.2 8.6 - 10.4 mg/dL    Sodium 142 135 - 144 mmol/L    Potassium 4.4 3.7 - 5.3 mmol/L    Chloride 107 98 - 107 mmol/L    CO2 26 20 - 31 mmol/L    Anion Gap 9 9 - 17 mmol/L    Alkaline Phosphatase 52 40 - 129 U/L    ALT 17 5 - 41 U/L    AST 21 <40 U/L    Total Bilirubin 0.39 0.3 - 1.2 mg/dL    Total Protein 6.6 6.4 - 8.3 g/dL    Albumin 4.0 3.5 - 5.2 g/dL    Albumin/Globulin Ratio 1.5 1.0 - 2.5    GFR Non-African American >60 >60 mL/min    GFR African American >60 >60 mL/min    GFR Comment          GFR Staging NOT REPORTED    TOX SCR, BLD, ED   Result Value Ref Range    Acetaminophen Level <5 (L) 10 - 30 ug/mL    Ethanol <10 <10 mg/dL    Ethanol percent <9.140 <1.568 %    Salicylate Lvl <1 (L) 3 - 10 mg/dL    Toxic Tricyclic Sc,Blood NEGATIVE NEGATIVE   DRUG SCREEN MULTI URINE   Result Value Ref Range    Amphetamine Screen, Ur NEGATIVE NEGATIVE    Barbiturate Screen, Ur NEGATIVE NEGATIVE    Benzodiazepine Screen, Urine NEGATIVE NEGATIVE    Cocaine Metabolite, Urine POSITIVE (A) NEGATIVE    Methadone Screen, Urine NEGATIVE NEGATIVE    Opiates, Urine NEGATIVE NEGATIVE    Phencyclidine, Urine NEGATIVE NEGATIVE    Propoxyphene, Urine NOT REPORTED NEGATIVE    Cannabinoid Scrn, Ur POSITIVE (A) NEGATIVE    Oxycodone Screen, Ur NEGATIVE NEGATIVE    Methamphetamine, Urine NOT REPORTED NEGATIVE    Tricyclic Antidepressants, Urine NOT REPORTED NEGATIVE    MDMA, Urine NOT REPORTED NEGATIVE    Buprenorphine Urine NOT REPORTED NEGATIVE    Test Information       Assay provides medical screening only. The absence of expected drug(s) and/or metabolite(s) may indicate diluted or adulterated urine, limitations of testing or timing of collection. RADIOLOGY:  None    EKG  None    All EKG's are interpreted by the Emergency Department Physician who either signs or Co-signs this chart in the absence of a cardiologist.    EMERGENCY DEPARTMENT COURSE:  Patient found seated upright in bed, no acute distress, not ill or toxic appearing. Engaged in cooperative exam.  Physical exam notable for no acute medical findings. Stable vitals. Patient is medically cleared for inpatient admission. Screening labs ordered to facilitate placement. Of note patient is Covid positive however he is asymptomatic, afebrile, not tachycardic or hypoxic. Patient accepted for inpatient admission at [facility], awaiting transport. PROCEDURES:  None    CONSULTS:  None    CRITICAL CARE:  None    FINAL IMPRESSION      1. Depression with suicidal ideation    2.  Opiate abuse, continuous ([facility] [address]          DISPOSITION / PLAN     DISPOSITION Decision To Transfer 01/01/2022 07:43:34 PM      PATIENT REFERRED TO:  No follow-up provider specified.     DISCHARGE MEDICATIONS:  New Prescriptions    No medications on file       Campbell Collins MD  Emergency Medicine Resident    (Please note that portions of this note were completed with a voice recognition program.  Efforts were made to edit the dictations but occasionally words are mis-transcribed.)        Campbell Collins MD  Resident  01/01/22 2521

## 2022-01-02 NOTE — PLAN OF CARE
[facility]  Initial Interdisciplinary Treatment Plan NO      Original treatment plan Date & Time: 1/2/2022  0834    Admission Type:  Admission Type: Voluntary    Reason for admission:   Reason for Admission: Suicidal thoughts to stab self due to drug use    Estimated Length of Stay:  5-7days  Estimated Discharge Date: to be determined by physician    PATIENT STRENGTHS:  Patient Strengths:Strengths: Positive Support,Motivated,No significant Physical Illness  Patient Strengths and Limitations:Limitations: Inappropriate/potentially harmful leisure interests  Addictive Behavior: Addictive Behavior  In the past 3 months, have you felt or has someone told you that you have a problem with:  : None  Do you have a history of Chemical Use?: No  Do you have a history of Alcohol Use?: No  Do you have a history of Street Drug Abuse?: Yes  Histroy of Prescripton Drug Abuse?: No  Medical Problems:History reviewed. No pertinent past medical history.   Status EXAM:Status and Exam  Normal: No  Facial Expression: Flat  Affect: Appropriate  Level of Consciousness: Alert  Mood:Normal: No  Mood: Depressed,Anhedonia,Empty  Motor Activity:Normal: Yes  Interview Behavior: Cooperative  Attention:Normal: Yes  Thought Content:Normal: Yes  Hallucinations: None  Delusions: No  Memory:Normal: Yes  Insight and Judgment: Yes  Present Suicidal Ideation: No  Present Homicidal Ideation: No    EDUCATION:   Learner Progress Toward Treatment Goals: reviewed group plans and strategies for care    Method:group therapy, medication compliance, individualized assessments and care planning    Outcome: needs reinforcement    PATIENT GOALS: to be discussed with patient within 72 hours    PLAN/TREATMENT RECOMMENDATIONS:     continue group therapy , medications compliance, goal setting, individualized assessments and care, continue to monitor pt on unit      SHORT-TERM GOALS:   Time frame for Short-Term Goals: 5-7 days    LONG-TERM GOALS:  Time

## 2022-01-02 NOTE — ED NOTES
SBIRT assessment complete. The results can be located in the ED navigator under screening questions. Patient scored as following:    AUDIT (Alcohol Screening Questionnaire): Patient was screened using the initial screening questions and her score was 0, which did not indicate a full assessment be complete. DAST (Drug Screening Questionnaire): 7  PHQ-9 (Depression Screening Questionnaire): 18      The patient was provided with the following resources/interventions: Patient is seeking inpatient substance use and mental health help. Writer and patient explored treatment options, he would like assistance with admission to [facility].      Start Time 1849  End Time 1906  Diagnosis Z71.51; Z13.31     ARIADNA Carter  01/01/22 1922

## 2022-01-02 NOTE — BH NOTE
Patient given tobacco quitline number 88246585715 at this time, refusing to call at this time, states \" I just dont want to quit now\"- patient given information as to the dangers of long term tobacco use. Continue to reinforce the importance of tobacco cessation.

## 2022-01-03 PROCEDURE — 1240000000 HC EMOTIONAL WELLNESS R&B

## 2022-01-03 PROCEDURE — 99253 IP/OBS CNSLTJ NEW/EST LOW 45: CPT | Performed by: INTERNAL MEDICINE

## 2022-01-03 PROCEDURE — 90833 PSYTX W PT W E/M 30 MIN: CPT | Performed by: PSYCHIATRY & NEUROLOGY

## 2022-01-03 PROCEDURE — 6370000000 HC RX 637 (ALT 250 FOR IP): Performed by: PSYCHIATRY & NEUROLOGY

## 2022-01-03 PROCEDURE — 6360000002 HC RX W HCPCS: Performed by: PSYCHIATRY & NEUROLOGY

## 2022-01-03 PROCEDURE — 99232 SBSQ HOSP IP/OBS MODERATE 35: CPT | Performed by: PSYCHIATRY & NEUROLOGY

## 2022-01-03 PROCEDURE — APPSS30 APP SPLIT SHARED TIME 16-30 MINUTES: Performed by: PSYCHIATRY & NEUROLOGY

## 2022-01-03 RX ORDER — BUPRENORPHINE AND NALOXONE 2; .5 MG/1; MG/1
1 FILM, SOLUBLE BUCCAL; SUBLINGUAL NIGHTLY
Status: DISCONTINUED | OUTPATIENT
Start: 2022-01-04 | End: 2022-01-04

## 2022-01-03 RX ADMIN — BUPRENORPHINE AND NALOXONE 1 FILM: 2; .5 FILM BUCCAL; SUBLINGUAL at 20:30

## 2022-01-03 RX ADMIN — SERTRALINE HYDROCHLORIDE 25 MG: 25 TABLET ORAL at 08:23

## 2022-01-03 RX ADMIN — DICYCLOMINE HYDROCHLORIDE 20 MG: 20 TABLET ORAL at 08:48

## 2022-01-03 RX ADMIN — TRAZODONE HYDROCHLORIDE 50 MG: 50 TABLET ORAL at 20:30

## 2022-01-03 RX ADMIN — BUPRENORPHINE AND NALOXONE 1 FILM: 2; .5 FILM BUCCAL; SUBLINGUAL at 08:22

## 2022-01-03 RX ADMIN — PROMETHAZINE HYDROCHLORIDE 25 MG: 25 TABLET ORAL at 08:48

## 2022-01-03 RX ADMIN — HYDROXYZINE HYDROCHLORIDE 50 MG: 50 TABLET, FILM COATED ORAL at 20:30

## 2022-01-03 NOTE — PLAN OF CARE
Problem: Gas Exchange - Impaired  Goal: Absence of hypoxia  Outcome: Ongoing  Note: Oxygenation is WNL. Problem: Body Temperature -  Risk of, Imbalanced  Goal: Ability to maintain a body temperature within defined limits  Outcome: Ongoing  Note: Body temperature is WNL. Problem: Isolation Precautions - Risk of Spread of Infection  Goal: Prevent transmission of infection  Outcome: Ongoing  Note: Isolation precautions are maintained as ordered. Problem: Fatigue  Goal: Verbalize increase energy and improved vitality  Outcome: Ongoing     Problem: Altered Mood, Depressive Behavior:  Goal: Able to verbalize and/or display a decrease in depressive symptoms  Description: Able to verbalize and/or display a decrease in depressive symptoms  Outcome: Ongoing  Note: Patient continues to report depression and anxiety. Patient is out in day area much of the afternoon. His eating and sleeping is fair. Goal: Ability to disclose and discuss suicidal ideas will improve  Description: Ability to disclose and discuss suicidal ideas will improve  Outcome: Ongoing  Note: Patient denies thoughts of harm to self or others. Visual safety checks are completed every 15 minutes and randomly. Goal: Absence of self-harm  Description: Absence of self-harm  Outcome: Ongoing     Problem: Substance Abuse:  Goal: Absence of drug withdrawal signs and symptoms  Description: Absence of drug withdrawal signs and symptoms  Outcome: Ongoing  Note: Patient denies withdrawal signs and symptoms at this time. Problem: Tobacco Use:  Goal: Inpatient tobacco use cessation counseling participation  Description: Inpatient tobacco use cessation counseling participation  Outcome: Ongoing  Note: Patient declines counseling at this time.

## 2022-01-03 NOTE — GROUP NOTE
Group Therapy Note    Date: 1/3/2022    Group Start Time: 1120  Group End Time: 3360  Group Topic: Psychotherapy    SALLIE Barrera Loges     Brief [facility]      [facility]    [address]    Client did participate in 1:1 individual brief therapy to engage in conversation regarding discharge and safety planning, encouragement to participate in treatment team as well as review ideas on the benefits of journaling.        Patient's Goal:  To actively participate in 1:1 meeting and to start discharge and safety planning as well as given a journal.  Discipline Responsible: /Counselor      Signature:  Gopal Valles, MSW, LSW

## 2022-01-03 NOTE — CONSULTS
[facility]    CONSULTATION / HISTORY AND PHYSICAL EXAMINATION            Date:   1/3/2022  Patient name:  Librado Magallon  Date of admission:  1/2/2022 12:43 AM  MRN:   [id number]  Account:  [de-identified]  YOB: 1995  PCP:    No primary care provider on file. Room:   [address] [place] [address]  Code Status:    Full Code    Physician Requesting Consult: Sean Lynne MD    Reason for Consult:  Medical management    Chief Complaint:     No chief complaint on file. COVID-19 pneumonia, abdominal pain    History Obtained From:     Patient, EMR, nursing staff    History of Present Illness:     covid 19 infection detected on admission, unvaccinated patient  Pt admitted for mental illness  Denies any symptoms  Had some abdominal pain on admission, but reports it was from the drugs that he had taken and the pain has resolved now  No physical complaints today      Past Medical History:     History reviewed. No pertinent past medical history. Past Surgical History:     History reviewed. No pertinent surgical history. Medications Prior to Admission:     Prior to Admission medications    Not on File        Allergies:     Patient has no known allergies. Social History:     Tobacco:    reports that he has been smoking cigarettes. He has been smoking about 0.50 packs per day. He has never used smokeless tobacco.  Alcohol:      reports previous alcohol use. Drug Use:  reports current drug use. Drug: IV. Family History:     History reviewed. No pertinent family history. Review of Systems:     Positive and Negative as described in HPI. Denies any shortness of breath or cough  Denies chest pain or palpitations  Positive for abdominal pain, loose stool, no vomiting  Denies any new numbness tremors or weakness.         Physical Exam:     /75   Pulse 65   Temp 98 °F (36.7 °C) (Oral)   Resp 16   Ht 5' 11\" (1.803 m)   Wt 250 lb (113.4 kg)   SpO2 98% BMI 34.87 kg/m²   Temp (24hrs), Av.1 °F (36.7 °C), Min:98 °F (36.7 °C), Max:98.2 °F (36.8 °C)    No results for input(s): POCGLU in the last 72 hours. No intake or output data in the 24 hours ending 22 1249    General Appearance:  alert, well appearing, and in no acute distress  Head:  normocephalic, atraumatic. Eye: no icterus, redness, pupils equal and reactive, extraocular eye movements intact, conjunctiva clear  Ear: normal external ear, no discharge, hearing intact  Nose:  no drainage noted  Mouth: mucous membranes moist  Neck: supple, no carotid bruits, thyroid not palpable  Lungs: Bilateral equal air entry, clear to ausculation, no wheezing, rales or rhonchi, normal effort  Cardiovascular: normal rate, regular rhythm, no murmur, gallop, rub. Abdomen: Soft, nontender, nondistended, normal bowel sounds, no hepatomegaly or splenomegaly  Neurologic: There are no new focal motor or sensory deficits, normal muscle tone and bulk, no abnormal sensation, normal speech, cranial nerves II through XII grossly intact  Skin: No gross lesions, rashes, bruising or bleeding on exposed skin area  Extremities:  peripheral pulses palpable, no pedal edema or calf pain with palpation      Investigations:      Laboratory Testing:  No results found for this or any previous visit (from the past 24 hour(s)). Imaging/Diagonstics:  Recent data reviewed    Assessment :      Primary Problem  Major depressive disorder, single episode, severe with psychotic features [facility])    Active Hospital Problems    Diagnosis Date Noted    Major depressive disorder, single episode, severe with psychotic features ([facility] [F32.3] 2022       Plan:     Reason for consult: General medical management     1. COVID-19 infection,  2. Class I obesity BMI 34  Labs within normal limits, no other medical conditions     Thank you for the consult. Medicine will sign off. Please do not hesitate to contact us for any issues or concerns. consultations:   Chente Winston MD  1/3/2022  12:49 PM    Copy sent to Dr. Espinal primary care provider on file. Please note that this chart was generated using voice recognition Dragon dictation software. Although every effort was made to ensure the accuracy of this automated transcription, some errors in transcription may have occurred.

## 2022-01-03 NOTE — PLAN OF CARE
Problem: Gas Exchange - Impaired  Goal: Absence of hypoxia  Intervention: Respiratory assessment  Note: Patient  denies any signs or symptoms of hypoxia, oxygen saturation reading 100% in room air. Problem: Body Temperature -  Risk of, Imbalanced  Goal: Ability to maintain a body temperature within defined limits  1/2/2022 2100 by Marya Valentin LPN  Note: Patient's body temperature within defined limits reading 98. 4. Problem: Fatigue  Goal: Verbalize increase energy and improved vitality  1/2/2022 2100 by Marya Valentin LPN  Outcome: Ongoing  Patient states he's tired and remained isolative to room throughout evening. Patient is cooperative, medication compliant and in behavior control. Staff maintains safety checks every 15 minutes. Problem: Altered Mood, Depressive Behavior:  Goal: Absence of self-harm  Description: Absence of self-harm  1/2/2022 2100 by Marya Valentin LPN  Outcome: Ongoing  Patient has not made any attempt to cause any self-harm at this time.

## 2022-01-03 NOTE — PLAN OF CARE
Problem: Gas Exchange - Impaired  Goal: Absence of hypoxia  1/2/2022 2030 by Victoriano Quiroz LPN  Outcome: Ongoing     Problem: Fatigue  Goal: Verbalize increase energy and improved vitality  1/2/2022 2030 by Victoriano Quiroz LPN  Outcome: Ongoing     Problem: Altered Mood, Depressive Behavior:  Goal: Absence of self-harm  Description: Absence of self-harm  1/2/2022 2030 by Victoriano Quiroz LPN  Outcome: Ongoing     Problem: Altered Mood, Depressive Behavior:  Goal: Absence of self-harm  Description: Absence of self-harm  1/2/2022 2030 by Victoriano Quiroz LPN  Outcome: Ongoing

## 2022-01-03 NOTE — GROUP NOTE
Group Therapy Note    Date: 1/3/2022    Group Start Time: 1330  Group End Time: 1520  Group Topic: Music Therapy    [facility]    [place] [facility]        Group Therapy Note    Attendees: 4/10         Patient's Goal:  Patients shared preferred music and dedicated songs to important people in their lives. Following every patient having a turn, patients were able to share music they were intersted in and engage in conversations for music enjoyment. Goals to reflect on relationships; Increase self-expression; Increase sense of community; Normalize the environemtn    Notes:  Patient attended and participated in group having positive interactions with peers and staff. Shared and dedicated music to a childhood friend. Expresses they've grown up and don't get to see each other much anymore, but that it is joyful when they do get to see each other. Pulled for meeting with  then returned following. Status After Intervention:  Improved    Participation Level:  Active Listener and Interactive    Participation Quality: Appropriate, Attentive and Sharing      Speech:  normal      Thought Process/Content: Logical  Linear      Affective Functioning: Congruent      Mood: euthymic      Level of consciousness:  Alert and Attentive      Response to Learning: Able to verbalize current knowledge/experience and Progressing to goal      Endings: None Reported    Modes of Intervention: Support, Socialization, Exploration, Activity, Media and Reality-testing      Discipline Responsible: Psychoeducational Specialist      Signature:  Teo Douglas

## 2022-01-03 NOTE — PLAN OF CARE
5 [facility]  Day 3 Interdisciplinary Treatment Plan NOTE    Review Date & Time: 1/3/2022   5336    Admission Type:   Admission Type: Voluntary    Reason for admission:  Reason for Admission: Suicidal thoughts to stab self due to drug use  Estimated Length of Stay: 5-7 days  Estimated Discharge Date Update: to be determined by physician    PATIENT STRENGTHS:  Patient Strengths Strengths: Positive Support,Motivated,No significant Physical Illness  Patient Strengths and Limitations:Limitations: Multiple barriers to leisure interests,Inappropriate/potentially harmful leisure interests,Difficulty problem solving/relies on others to help solve problems  Addictive Behavior:Addictive Behavior  In the past 3 months, have you felt or has someone told you that you have a problem with:  : None  Do you have a history of Chemical Use?: No  Do you have a history of Alcohol Use?: No  Do you have a history of Street Drug Abuse?: Yes  Histroy of Prescripton Drug Abuse?: No  Medical Problems:History reviewed. No pertinent past medical history.     Risk:  Fall RiskTotal: 53  Eugene Scale Eugene Scale Score: 22  BVC    Change in scores no Changes to plan of Care no    Status EXAM:   Status and Exam  Normal: No  Facial Expression: Flat  Affect: Appropriate  Level of Consciousness: Alert  Mood:Normal: No  Mood: Depressed,Anxious  Motor Activity:Normal: No  Motor Activity: Decreased  Interview Behavior: Cooperative  Preception: [place] to Person,[place] to Time,[place] to Place,[place] to Situation  Attention:Normal: Yes  Thought Content:Normal: Yes  Hallucinations: None  Delusions: No  Memory:Normal: No  Memory: Poor Recent  Insight and Judgment: No  Insight and Judgment: Poor Judgment,Poor Insight  Present Suicidal Ideation: No  Present Homicidal Ideation: No    Daily Assessment Last Entry:   Daily Sleep (WDL): Within Defined Limits         Patient Currently in Pain: Denies  Daily Nutrition (WDL): Exceptions to [facility]  Appetite Change: Decreased  Barriers to Nutrition: Other (Comment) (Decreased appetite due to Covid/possible withdrawal)  Level of Assistance: Independent/Self    Patient Monitoring:  Frequency of Checks: 4 times per hour, close    Psychiatric Symptoms:   Depression Symptoms  Depression Symptoms: Loss of interest,Isolative  Anxiety Symptoms  Anxiety Symptoms: Generalized  Gisel Symptoms  Gisel Symptoms: No problems reported or observed. Psychosis Symptoms  Delusion Type: No problems reported or observed. Suicide Risk CSSR-S:  1) Within the past month, have you wished you were dead or wished you could go to sleep and not wake up? : Yes  2) Have you actually had any thoughts of killing yourself? : No  6) Have you ever done anything, started to do anything, or prepared to do anything to end your life?: No  Change in Result NO Change in Plan of care NO      EDUCATION:   EDUCATION:   Learner Progress Toward Treatment Goals: Reviewed results and recommendations of this team, Reviewed group plan and strategies, Reviewed signs, symptoms and risk of self harm and violent behavior, Reviewed goals and plan of care    Method:small group, individual verbal education    Outcome:verbalized by patient, but needs reinforcement to obtain goals    PATIENT GOALS:  Short term: Begin sobriety; Link with outpatient therapy  Long term: Get back on SSI;  Independent housing; Maintain sobriety    PLAN/TREATMENT RECOMMENDATIONS UPDATE: continue with group therapies, increased socialization, continue planning for after discharge goals, continue with medication compliance    SHORT-TERM GOALS UPDATE:   Time frame for Short-Term Goals: 5-7 days    LONG-TERM GOALS UPDATE:   Time frame for Long-Term Goals: 6 months  Members Present in Team Meeting: See Signature Sheet    Marleni Beebe

## 2022-01-03 NOTE — PROGRESS NOTES
Daily Progress Note  1/3/2022    Patient Name: Robert Burch    CHIEF COMPLAINT: Suicidal thoughts to stab self with a knife. SUBJECTIVE:      Medication Adherence: Patient has been medication compliant today. Emergency Medications: Patient has not required any emergency medications today. Appetite: Patient reports decreased. He requested for his nutrition supplement to be vanilla flavored. Adult oral nutrition supplement order modified to include patient's request.    Sleep: Patient reports adequate and restorative, but endorses difficulty falling asleep last night. Patient is seen today for a follow up assessment. He was agreeable to speaking with writer in the day area. He endorses depression and anxiety today. He reports improvement in suicidal ideation without intent or plans. He denies homicidal ideation, intent, or plans. He contracts for safety on the unit. Patient reports he would be unsafe off the unit. Patient denies auditory hallucinations and denies visual hallucinations. He denies paranoia. He endorses delusions and reports he feels he can read other people's minds. Patient also reports he feels the media communicates directly with him. He denies any medication side effects or medical concerns at the time of assessment. Patient denies any withdrawal symptoms at the time of assessment. Writer encouraged patient to attend group on the unit. At this time, the patient is not appropriate for a lower level of care. There is risk of decompensation and patient warrants further hospitalization for safety and stabilization. Group Attendance on Unit:   [] Yes  [] Selectively    [x] No         Mental Status Exam  Level of consciousness: Alert and awake. Appearance: Appropriate attire for setting, seated in chair, with fair  grooming and hygiene. Behavior/Motor: Approachable, no psychomotor abnormalities.    Attitude toward examiner: Cooperative, attentive, good eye contact. Speech: Normal rate, normal volume, normal tone. Mood:  Patient reports \"better\". Affect: Flat. Thought processes: Linear and coherent. Thought content: Denies homicidal ideation. Suicidal Ideation: Reports improvement in suicidal ideations, without current plan or intent, contracts for safety on the unit. Delusions: Patient presents with delusions. Denies paranoia. Perceptual Disturbance: Patient does not appear to be responding to internal stimuli. Denies auditory hallucinations. Denies visual hallucinations. Cognition: Oriented to self, location, time, and situation. Memory: Intact. Insight & Judgement: Poor. Data   height is 5' 11\" (1.803 m) and weight is 250 lb (113.4 kg). His oral temperature is 98 °F (36.7 °C). His blood pressure is 116/75 and his pulse is 65. His respiration is 16 and oxygen saturation is 98%. Labs:   No visits with results within 2 Day(s) from this visit.    Latest known visit with results is:   Admission on 01/01/2022, Discharged on 01/01/2022   Component Date Value Ref Range Status    WBC 01/01/2022 4.3  3.5 - 11.3 k/uL Final    RBC 01/01/2022 5.01  4.21 - 5.77 m/uL Final    Hemoglobin 01/01/2022 15.1  13.0 - 17.0 g/dL Final    Hematocrit 01/01/2022 45.0  40.7 - 50.3 % Final    MCV 01/01/2022 89.8  82.6 - 102.9 fL Final    MCH 01/01/2022 30.1  25.2 - 33.5 pg Final    MCHC 01/01/2022 33.6  28.4 - 34.8 g/dL Final    RDW 01/01/2022 11.6* 11.8 - 14.4 % Final    Platelets 15/25/6957 278  138 - 453 k/uL Final    MPV 01/01/2022 9.3  8.1 - 13.5 fL Final    NRBC Automated 01/01/2022 0.0  0.0 per 100 WBC Final    Differential Type 01/01/2022 NOT REPORTED   Final    Seg Neutrophils 01/01/2022 55  36 - 65 % Final    Lymphocytes 01/01/2022 37  24 - 43 % Final    Monocytes 01/01/2022 7  3 - 12 % Final    Eosinophils % 01/01/2022 1  1 - 4 % Final    Basophils 01/01/2022 0  0 - 2 % Final    Immature Granulocytes 01/01/2022 0  0 % Final    Segs Absolute 01/01/2022 2.38  1.50 - 8.10 k/uL Final    Absolute Lymph # 01/01/2022 1.61  1.10 - 3.70 k/uL Final    Absolute Mono # 01/01/2022 0.32  0.10 - 1.20 k/uL Final    Absolute Eos # 01/01/2022 <0.03  0.00 - 0.44 k/uL Final    Basophils Absolute 01/01/2022 <0.03  0.00 - 0.20 k/uL Final    Absolute Immature Granulocyte 01/01/2022 <0.03  0.00 - 0.30 k/uL Final    WBC Morphology 01/01/2022 NOT REPORTED   Final    RBC Morphology 01/01/2022 NOT REPORTED   Final    Platelet Estimate 55/84/9982 NOT REPORTED   Final    Glucose 01/01/2022 90  70 - 99 mg/dL Final    BUN 01/01/2022 5* 6 - 20 mg/dL Final    CREATININE 01/01/2022 0.79  0.70 - 1.20 mg/dL Final    Bun/Cre Ratio 01/01/2022 NOT REPORTED  9 - 20 Final    Calcium 01/01/2022 9.2  8.6 - 10.4 mg/dL Final    Sodium 01/01/2022 142  135 - 144 mmol/L Final    Potassium 01/01/2022 4.4  3.7 - 5.3 mmol/L Final    SPECIMEN SLIGHTLY HEMOLYZED, RESULTS MAY BE ADVERSELY AFFECTED.  Chloride 01/01/2022 107  98 - 107 mmol/L Final    CO2 01/01/2022 26  20 - 31 mmol/L Final    Anion Gap 01/01/2022 9  9 - 17 mmol/L Final    Alkaline Phosphatase 01/01/2022 52  40 - 129 U/L Final    SPECIMEN SLIGHTLY HEMOLYZED, RESULTS MAY BE ADVERSELY AFFECTED.  ALT 01/01/2022 17  5 - 41 U/L Final    SPECIMEN SLIGHTLY HEMOLYZED, RESULTS MAY BE ADVERSELY AFFECTED.  AST 01/01/2022 21  <40 U/L Final    SPECIMEN SLIGHTLY HEMOLYZED, RESULTS MAY BE ADVERSELY AFFECTED.     Total Bilirubin 01/01/2022 0.39  0.3 - 1.2 mg/dL Final    Total Protein 01/01/2022 6.6  6.4 - 8.3 g/dL Final    Albumin 01/01/2022 4.0  3.5 - 5.2 g/dL Final    Albumin/Globulin Ratio 01/01/2022 1.5  1.0 - 2.5 Final    GFR Non- 01/01/2022 >60  >60 mL/min Final    GFR  01/01/2022 >60  >60 mL/min Final    GFR Comment 01/01/2022        Final    Comment: Average GFR for 2129 years old:   116 mL/min/1.73sq m  Chronic Kidney Disease:   <60 mL/min/1.73sq m  Kidney failure:   <15 mL/min/1.73sq m              eGFR calculated using average adult body mass.  Additional eGFR calculator available at:        [employer]            GFR Staging 01/01/2022 NOT REPORTED   Final    Acetaminophen Level 01/01/2022 <5* 10 - 30 ug/mL Final    Ethanol 01/01/2022 <10  <10 mg/dL Final    Ethanol percent 01/01/2022 <0.010  <5.376 % Final    Salicylate Lvl 20/29/5010 <1* 3 - 10 mg/dL Final    Toxic Tricyclic Sc,Blood 78/36/6660 NEGATIVE  NEGATIVE Final    Amphetamine Screen, Ur 01/01/2022 NEGATIVE  NEGATIVE Final    Comment:       (Positive cutoff 1000 ng/mL)                  Barbiturate Screen, Ur 01/01/2022 NEGATIVE  NEGATIVE Final    Comment:       (Positive cutoff 200 ng/mL)                  Benzodiazepine Screen, Urine 01/01/2022 NEGATIVE  NEGATIVE Final    Comment:       (Positive cutoff 200 ng/mL)                  Cocaine Metabolite, Urine 01/01/2022 POSITIVE* NEGATIVE Final    Comment:       (Positive cutoff 300 ng/mL)                  Methadone Screen, Urine 01/01/2022 NEGATIVE  NEGATIVE Final    Comment:       (Positive cutoff 300 ng/mL)                  Opiates, Urine 01/01/2022 NEGATIVE  NEGATIVE Final    Comment:       (Positive cutoff 300 ng/mL)                  Phencyclidine, Urine 01/01/2022 NEGATIVE  NEGATIVE Final    Comment:       (Positive cutoff 25 ng/mL)                  Propoxyphene, Urine 01/01/2022 NOT REPORTED  NEGATIVE Final    Cannabinoid Scrn, Ur 01/01/2022 POSITIVE* NEGATIVE Final    Comment:       (Positive cutoff 50 ng/mL)                  Oxycodone Screen, Ur 01/01/2022 NEGATIVE  NEGATIVE Final    Comment:       (Positive cutoff 100 ng/mL)                  Methamphetamine, Urine 01/01/2022 NOT REPORTED  NEGATIVE Final    Tricyclic Antidepressants, Urine 01/01/2022 NOT REPORTED  NEGATIVE Final    MDMA, Urine 01/01/2022 NOT REPORTED  NEGATIVE Final    Buprenorphine Urine 01/01/2022 NOT REPORTED  NEGATIVE Final    Test Information 01/01/2022 Assay provides medical screening only. The absence of expected drug(s) and/or metabolite(s) may indicate diluted or adulterated urine, limitations of testing or timing of collection. Final    Comment: Testing for legal purposes should be confirmed by another method. To request confirmation   of test result, please call the lab within 7 days of sample submission.  Specimen Description 01/01/2022 . NASOPHARYNGEAL SWAB   Final    SARS-CoV-2, Rapid 01/01/2022 DETECTED* Not Detected Final    Comment:       Rapid NAAT: The specimen is POSITIVE for SARS-Cov-2, the novel coronavirus associated with   COVID-19. This test has been authorized by the FDA under an Emergency Use Authorization (EUA) for use   by authorized laboratories. The ID NOW COVID-19 assay is designed to detect the virus that causes COVID-19 in patients   with signs and symptoms of infection who are suspected of COVID-19. An individual without symptoms of COVID-19 and who is not shedding SARS-CoV-2 virus would   expect to have a negative (not detected) result in this assay. Fact sheet for Healthcare Providers: [employer]orge  Fact sheet for Patients: [email or web address]          Methodology: Isothermal Nucleic Acid Amplification        Results reported to the appropriate Health Department           Reviewed patient's current plan of care and vital signs with nursing staff. Labs reviewed: [x] Yes  Last EKG in EMR reviewed: [x] Yes  QTc: 453.     Medications  Current Facility-Administered Medications: nicotine polacrilex (NICORETTE) gum 2 mg, 2 mg, Oral, Q1H PRN  acetaminophen (TYLENOL) tablet 650 mg, 650 mg, Oral, Q4H PRN  aluminum & magnesium hydroxide-simethicone (MAALOX) 200-200-20 MG/5ML suspension 30 mL, 30 mL, Oral, Q6H PRN  hydrOXYzine (ATARAX) tablet 50 mg, 50 mg, Oral, TID PRN  ibuprofen (ADVIL;MOTRIN) tablet 400 mg, 400 mg, Oral, Q6H PRN  polyethylene glycol (GLYCOLAX) packet 17 g, 17 g, Oral, Daily PRN  traZODone (DESYREL) tablet 50 mg, 50 mg, Oral, Nightly PRN  dicyclomine (BENTYL) tablet 20 mg, 20 mg, Oral, Q6H PRN  buprenorphine-naloxone (SUBOXONE) 2-0.5 MG SL film 1 Film, 1 Film, SubLINGual, BID  sertraline (ZOLOFT) tablet 25 mg, 25 mg, Oral, Daily    ASSESSMENT  Major depressive disorder, single episode, severe with psychotic features ([facility] [address]         PLAN  Patient symptoms are: Minimally improving. Continue current medication regimen. Monitor need and frequency of PRN medications. Order modification: Adult oral nutrition supplement order modified to include \"please provide vanilla flavor if possible per patient's request\". Encourage participation in groups and milieu. Consult to internal medicine was previously ordered. Attempt to develop insight. Psycho-education conducted. Supportive Therapy conducted. Probable discharge is to be determined by MD.   Follow-up daily while inpatient. Patient continues to be monitored in the inpatient psychiatric facility at [facility] for safety and stabilization. Patient continues to need, on a daily basis, active treatment furnished directly by or requiring the supervision of inpatient psychiatric personnel. Electronically signed by MANSOOR Villa CNP on 1/3/2022 at 2:12 PM    **This report has been created using voice recognition software. It may contain minor errors which are inherent in voice recognition technology. **    I independently saw and evaluated the patient. I reviewed the nurse practitioners documentation above. Any additional comments or changes to the nurse practitioners documentation are stated below otherwise agree with assessment. Plan will be as follows:  Patient denying suicidal ideation to this author today. States he was just trying to seek help for his opiate problem. Denying auditory or visual hallucinations. States he feels he could contract for safety.   Seems disillusioned at the idea of needing to be in the hospital for 5 days for Covid quarantine. Requesting potential discharge tomorrow. Asked the patient to think about it. Will reduce Suboxone to once daily at bedtime. Spent 30 minutes with the patient, of that greater than 16 minutes spent in supportive psychotherapy  PLAN  Patient s symptoms   are improving  Decrease Suboxone to 2 mg at bedtime  Attempt to develop insight  Psycho-education conducted. Supportive Therapy conducted.   Probable discharge is undetermined at this time  Follow-up daily while on inpatient unit

## 2022-01-04 VITALS
HEART RATE: 72 BPM | SYSTOLIC BLOOD PRESSURE: 153 MMHG | WEIGHT: 250 LBS | DIASTOLIC BLOOD PRESSURE: 79 MMHG | TEMPERATURE: 98.5 F | BODY MASS INDEX: 35 KG/M2 | RESPIRATION RATE: 14 BRPM | HEIGHT: 71 IN | OXYGEN SATURATION: 98 %

## 2022-01-04 PROCEDURE — 99239 HOSP IP/OBS DSCHRG MGMT >30: CPT | Performed by: PSYCHIATRY & NEUROLOGY

## 2022-01-04 PROCEDURE — 6370000000 HC RX 637 (ALT 250 FOR IP): Performed by: PSYCHIATRY & NEUROLOGY

## 2022-01-04 RX ORDER — TRAZODONE HYDROCHLORIDE 50 MG/1
50 TABLET ORAL NIGHTLY PRN
Qty: 30 TABLET | Refills: 0 | Status: SHIPPED | OUTPATIENT
Start: 2022-01-04 | End: 2022-04-23

## 2022-01-04 RX ORDER — SERTRALINE HYDROCHLORIDE 25 MG/1
50 TABLET, FILM COATED ORAL DAILY
Qty: 30 TABLET | Refills: 0 | Status: SHIPPED | OUTPATIENT
Start: 2022-01-05 | End: 2022-04-23

## 2022-01-04 RX ADMIN — SERTRALINE HYDROCHLORIDE 25 MG: 25 TABLET ORAL at 08:16

## 2022-01-04 NOTE — DISCHARGE SUMMARY
Provider Discharge Summary     Patient ID:  Ana Mendez  [id number]  89 y.o.  1995    Admit date: 1/2/2022    Discharge date and time: 1/4/2022  6:33 PM     Admitting Physician: Ngozi Carey MD     Discharge Physician: Tate Fields MD    Admission Diagnoses: Depression with suicidal ideation [F32. A, R45.851]    Discharge Diagnoses:      Major depressive disorder, single episode, severe with psychotic features [facility])     Patient Active Problem List   Diagnosis Code    Depression with suicidal ideation F32. A, R45.851    Major depressive disorder, single episode, severe with psychotic features ([facility] [address] F32.3        Admission Condition: poor    Discharged Condition: stable    Indication for Admission: threat to self    History of Present Illnes (present tense wording is of findings from admission exam and are not necessarily indicative of current findings):   Ana Mendez is a 27 y.o. male who has a past medical history of impaired gastric change, body temperature risk of imbalance, isolation precautions risk of spread of infection, fatigue, altered mood depressive behavior, substance abuse, and tobacco use. Problems from outside sources in EMR indicate: C/O overdose, nicotine dependence cigarettes uncomplicated, poisoning by oth opioids accidental (unintentional) init, schizophrenia paranoid.      Per ED provider documentation:     \"Subha Dumas is a 27 y. o. male who presents with 3 days progressively worsening suicidal ideation with intent to stab himself with a knife.  Patient does have access to a knife and was about to do so today when he spoke with his father who convinced him to seek treatment. Emilee Rubio has a history of fentanyl abuse, last used earlier today but would like to become sober.  No medical concerns or complaints\".    Per ED  documentation:     \"Writer met with patient at bedside regarding concerns of substance use and suicidal ideations.  Patient is alert, oriented, calm and cooperative. Patient reports that he has been a daily Fentanyl user for the past year. Frederic Michele reports that he uses $100 a day via inhalation. Patient states that he has been trying to stop on his own but has not been successful. Patient reports he typically starts to have withdraw symptoms 8-12 hours after his last use.  He last used around 9am and states that he is starting to feel nauseas. Typically he reports withdraw symptoms of nausea, diarrhea, hot and cold chills, anxiety, runny nose.  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 Patient states he smoke marijuana occasionally and denies other substance use concerns. Patient states that he has not been in treatment before for his substance use and is seeking inpatient treatment tonight.       Patient additionally reports suicidal ideations with an earlier thought to stab himself. Criss Perfect states that he called his father and father brought patient to the ED for help.  Patient states that he does have a history of depression with a previous suicide attempt by overdose several years ago. Frederic Michele reports that today, his suicidal ideations are a result of his inability to stop using drugs.      Patient is seeking assistance with inpatient treatment for both substance use detox and mental health\".       Today:     Patient was agreeable to speaking with writer today in the day area. Patient reports he was having suicidal ideation to stab himself with a knife. He reports the suicidal ideation was linked to fentanyl use. Patient reports he called his dad and reports his dad took him to [facility]. Patient then reports he was taken to [facility]. He identifies his stressor as drug use.     Patient endorses having depression for all his life. Patient reports his depression has worsened for the last couple of months. Patient endorses anhedonia.   Patient reports feeling helpless, hopeless, and worthless. Patient endorses poor energy and poor concentration. Patient currently endorses fleeting suicidal ideation without intent or plans. Patient contracts for safety on the unit. Patient reports he would be unsafe off the unit. Patient denies homicidal ideation, intent, or plans.     Patient endorses anxiety. Patient endorses panic attacks in the past.  Patient denies meera/hypomania symptoms. However, patient endorses impulsivity. He also reports a history of bipolar. He denies phobias. Denies obsessions or compulsions. Denies body or vocal tics. Patient currently denies visual hallucinations, but endorses visual hallucinations while on drugs. Patient reports he was seeing\" things that probably wasn't there\". He denies auditory hallucinations. He denies paranoia. Patient presents with delusions and reports he feels he can read other people's thoughts. Patient also reports he feels the media communicates directly with him. Patient endorses PTSD and endorses nightmares and flashbacks. He endorses a history of emotional trauma.     Patient endorses decreased appetite. He denies any changes in his weight. He denies history of eating disorders. He reports his sleep has been fair. He reports getting on average 2 to 3 hours of sleep. He endorses difficulty falling asleep and difficulty staying asleep.     He endorses history of head trauma and reports he has had concussions in the past.  Denies history of seizures. Endorses history of violence/aggression. Endorses history of self-injurious behaviors and reports cutting in the past.     When asked about medical concerns, patient endorses stomach pain and describes it as achy and sharp. He rates the pain at a 8 out of 10 (0-10 scale with zero being no pain and 10 being worst). Patient also endorses lower back pain which she reports is new and describes the pain as achy.   He rates the pain as 8 out of 10 (0-10 scale with zero being no pain and 10 being worst). He denies any other medical concerns at the time of assessment.     Patient reports he does not currently follow with any psychiatric services or providers. He endorses one lifetime suicide attempt a couple years ago and reports he overdosed on Tylenol. Patient denies any previous psychiatric hospital admissions and reports this is currently his first psychiatric hospital admission. He denies taking any past psychiatric medications and reports he has no home medications.     Patient endorses cigarette use and reports he smokes a pack a day when he can afford it. Patient endorses cannabis use and reports he smokes cannabis at least two times a week and states \"a relo\". Patient reports he drinks alcohol socially. Patient reports he snorts cocaine and states \"when I can get it\". Patient reports heroin use yesterday and reports he snorted it, but reports he is unsure of the amount. Patient also endorses fentanyl use yesterday and reports he snorted it. He reports daily fentanyl use. Patient endorses withdrawal symptoms and reports back pain and stomach pain. Urine drug screen results from 1/1/2022 was positive for cannabinoid screening and cocaine metabolite.     Patient requires inpatient hospitalization at this time for safety and stability. Patient would benefit from active treatment furnished directly by or requiring the supervision of inpatient psychiatric personnel. Hospital Course:   Upon admission, Leonarda Cordova was provided a safe secure environment, introduced to unit milieu. Patient participated in groups and individual therapies. Meds were adjusted as noted below. Patient was offered assistance in placement into inpatient rehab. After couple days he declined this despite initially stating he wanted rehab and requested to go. After few days of hospital care, patient began to feel improvement. Depression lifted, thoughts to harm self ceased.   Sleep signed by Adron Seip, MD on 1/4/22 at 6:33 PM EST    Time Spent on discharge is more than 30 minutes in the examination, evaluation, counseling and review of medications and discharge plan.

## 2022-01-04 NOTE — GROUP NOTE
HS Goal Group     Date: January 3, 2022   Group Start Time: 2000   Group End Time: 2030   [facility]   Attendees: 15/10     Group Topic: HS Goal Group   Notes: Patient participated in HS goal group. Status After Intervention: Improved     Participation Level:  Active Listener and Interactive     Participation Quality: Appropriate, attentive and supportive     Speech: Normal     Thought Process/Content: Logical and linear     Affective Functioning: Congruent     Mood: WDL     Level of consciousness: Oriented x4     Response to Learning: Progressing to goal; able to verbalize current knowledge/experience, acknowledge new learning, retain information and change behavior      Endings: None reported     Modes of Intervention: Education and exploration     Discipline Responsible: Behavioral Health Tech   Signature: SHOLA Aden

## 2022-01-04 NOTE — GROUP NOTE
Group Therapy Note    Date: 1/4/2022    Group Start Time: 1330  Group End Time: 0640  Group Topic: Recreational    [facility]        Group Therapy Note    Attendees: 4/9         Patient's Goal:  Patients engaged in game group for leisure opportunities; increase socialization;    Notes:  Patinet attended and participated in group having positive interactions with peers and staff    Status After Intervention:  Improved    Participation Level:  Active Listener and Interactive    Participation Quality: Appropriate, Attentive and Sharing      Speech:  normal      Thought Process/Content: Logical  Linear      Affective Functioning: Congruent      Mood: euthymic      Level of consciousness:  Alert and Attentive      Response to Learning: Able to verbalize current knowledge/experience and Progressing to goal       Endings: None Reported    Modes of Intervention: Socialization, Activity and Reality-testing      Discipline Responsible: Psychoeducational Specialist      Signature:  Mariano Sorensen

## 2022-01-04 NOTE — BH NOTE
[facility]  Discharge Note    Pt discharged with followings belongings:   Dental Appliances: None  Vision - Corrective Lenses: None  Hearing Aid: None  Jewelry: Bracelet  Body Piercings Removed: N/A  Clothing: Eden Rogelio / coat,Pants,Shirt,Socks  Were All Patient Medications Collected?: Not Applicable  Other Valuables: Other (Comment) (see belonging sheet)   Valuables sent home with patient/[id number] or returned to patient. Patient education on aftercare instructions: yes  Information faxed to [facility] by staff  at 4:11 PM .Patient verbalize understanding of AVS:  yes. Status EXAM upon discharge:Patient alert and orient x 4. Speech clear. Patient denies thoughts of self harm and verbalizes ready for discharge. Patient discharged to home and transported by cab services.    Status and Exam  Normal: No  Facial Expression: Flat  Affect: Appropriate  Level of Consciousness: Alert  Mood:Normal: No  Mood: Depressed,Anxious  Motor Activity:Normal: Yes  Motor Activity: Decreased  Interview Behavior: Cooperative  Preception: [place] to Person,[place] to Time,[place] to Place,[place] to Situation  Attention:Normal: No  Attention: Unable to Concentrate  Thought Processes: Blocking  Thought Content:Normal: No  Thought Content: Preoccupations  Hallucinations: None  Delusions: No  Memory:Normal: Yes  Memory: Poor Recent  Insight and Judgment: No  Insight and Judgment: Poor Insight  Present Suicidal Ideation: No  Present Homicidal Ideation: No      Metabolic Screening:    No results found for: LABA1C    No results found for: CHOL  No results found for: TRIG  No results found for: HDL  No components found for: LDLCAL  No results found for: LABVLDL    Marcy Aschoff, RN

## 2022-01-04 NOTE — PLAN OF CARE
Problem: Gas Exchange - Impaired  Goal: Absence of hypoxia  1/3/2022 2235 by Bob Salazar RN  Outcome: Met This Shift     Problem:  Body Temperature -  Risk of, Imbalanced  Goal: Ability to maintain a body temperature within defined limits  1/3/2022 2235 by Bob Salazar RN  Outcome: Met This Shift     Problem: Isolation Precautions - Risk of Spread of Infection  Goal: Prevent transmission of infection  1/3/2022 2235 by Bob Salazar RN  Outcome: Met This Shift  Note: Patient admitted to COVID unit     Problem: Fatigue  Goal: Verbalize increase energy and improved vitality  1/3/2022 2235 by Bob Salazar RN  Outcome: Met This Shift

## 2022-01-04 NOTE — GROUP NOTE
Group Therapy Note    Date: 1/4/2022    Group Start Time: 1000  Group End Time: 8224  Group Topic: Psychotherapy    [address], [place], [place]        Group Therapy Note    Attendees: 3/10     Patient refused to attend psychotherapy group at 10:00 am after encouragement from staff. 1:1 talk time provided as alternative to group session.     Discipline Responsible: /Counselor      Signature:  PINKY Wei, KIERAN

## 2022-01-04 NOTE — PLAN OF CARE
Problem: Altered Mood, Depressive Behavior:  Goal: Able to verbalize and/or display a decrease in depressive symptoms  Description: Able to verbalize and/or display a decrease in depressive symptoms  1/3/2022 2103 by Mil Lancaster  Outcome: Ongoing  Patient admits to feelings of depression and rates the severity 7/10. Writer offered support and patient accepted. Will continue to monitor and provide support as needed. Q15min checks for safety. Problem: Altered Mood, Depressive Behavior:  Goal: Ability to disclose and discuss suicidal ideas will improve  Description: Ability to disclose and discuss suicidal ideas will improve  1/3/2022 2103 by Mil Lancaster  Outcome: Ongoing  Patient denies suicidal ideations. Agreeable to talking with staff if thoughts to harm self arise. Q15min checks maintained for safety. Problem: Altered Mood, Depressive Behavior:  Goal: Absence of self-harm  Description: Absence of self-harm  1/3/2022 2103 by Mil Lancaster  Outcome: Ongoing  Safe environment maintained and patient remains free from self-harm. Agreeable to seeking staff should thoughts to harm self or others arise. Q15min checks for safety.

## 2022-01-04 NOTE — BH NOTE
Patient refused to attend goal group at 0900 after encouragement from staff.  1:1 talk time offered but refused

## 2022-01-04 NOTE — GROUP NOTE
Group Therapy Note    Date: 1/4/2022    Group Start Time: 1430  Group End Time: 9018  Group Topic: Music Therapy    SALLIE CHANEL    Anders Prasad        Group Therapy Note    Attendees: 3/9         Patient's Goal:  Patient shared music and engaged in occassionally conversation. Patients periodically pulled for D/C planning. Goals to increase sense of community; Increase socialization; Increase self-expression    Notes:  Patient attended and participated in group having positive interactions with peers and staff. Status After Intervention:  Improved    Participation Level:  Active Listener and Interactive    Participation Quality: Appropriate, Attentive and Sharing      Speech:  normal      Thought Process/Content: Logical  Linear      Affective Functioning: Congruent      Mood: euthymic      Level of consciousness:  Alert and Attentive      Response to Learning: Able to verbalize current knowledge/experience and Progressing to goal      Endings: None Reported    Modes of Intervention: Socialization and Exploration      Discipline Responsible: Psychoeducational Specialist      Signature:  Anders Prasad

## 2022-04-01 ENCOUNTER — HOSPITAL ENCOUNTER (EMERGENCY)
Age: 27
Discharge: HOME | End: 2022-04-01
Attending: EMERGENCY MEDICINE
Payer: MEDICAID

## 2022-04-01 VITALS
BODY MASS INDEX: 35 KG/M2 | RESPIRATION RATE: 16 BRPM | HEIGHT: 71 IN | TEMPERATURE: 97.9 F | WEIGHT: 250 LBS | OXYGEN SATURATION: 96 % | HEART RATE: 92 BPM | DIASTOLIC BLOOD PRESSURE: 86 MMHG | SYSTOLIC BLOOD PRESSURE: 137 MMHG

## 2022-04-01 DIAGNOSIS — K08.89 PAIN, DENTAL: Primary | ICD-10-CM

## 2022-04-01 PROCEDURE — 99284 EMERGENCY DEPT VISIT MOD MDM: CPT

## 2022-04-01 PROCEDURE — 6370000000 HC RX 637 (ALT 250 FOR IP): Performed by: HEALTH CARE PROVIDER

## 2022-04-01 PROCEDURE — 99283 EMERGENCY DEPT VISIT LOW MDM: CPT

## 2022-04-01 RX ORDER — ACETAMINOPHEN 500 MG
1000 TABLET ORAL ONCE
Status: COMPLETED | OUTPATIENT
Start: 2022-04-01 | End: 2022-04-01

## 2022-04-01 RX ORDER — ACETAMINOPHEN 500 MG
1000 TABLET ORAL 3 TIMES DAILY
Qty: 42 TABLET | Refills: 0 | Status: SHIPPED | OUTPATIENT
Start: 2022-04-01 | End: 2022-05-27 | Stop reason: SDUPTHER

## 2022-04-01 RX ORDER — PENICILLIN V POTASSIUM 250 MG/1
500 TABLET ORAL ONCE
Status: COMPLETED | OUTPATIENT
Start: 2022-04-01 | End: 2022-04-01

## 2022-04-01 RX ORDER — PENICILLIN V POTASSIUM 500 MG/1
500 TABLET ORAL 4 TIMES DAILY
Qty: 27 TABLET | Refills: 0 | Status: SHIPPED | OUTPATIENT
Start: 2022-04-01 | End: 2022-04-08

## 2022-04-01 RX ORDER — IBUPROFEN 800 MG/1
800 TABLET ORAL ONCE
Status: COMPLETED | OUTPATIENT
Start: 2022-04-01 | End: 2022-04-01

## 2022-04-01 RX ORDER — IBUPROFEN 800 MG/1
800 TABLET ORAL EVERY 8 HOURS PRN
Qty: 21 TABLET | Refills: 0 | Status: SHIPPED | OUTPATIENT
Start: 2022-04-01 | End: 2022-05-27 | Stop reason: SDUPTHER

## 2022-04-01 RX ADMIN — PENICILLIN V POTASSIUM 500 MG: 250 TABLET ORAL at 10:26

## 2022-04-01 RX ADMIN — BENZOCAINE 1 EACH: 220 GEL, DENTIFRICE DENTAL at 10:10

## 2022-04-01 RX ADMIN — ACETAMINOPHEN 1000 MG: 500 TABLET ORAL at 10:09

## 2022-04-01 RX ADMIN — IBUPROFEN 800 MG: 800 TABLET, FILM COATED ORAL at 10:09

## 2022-04-01 ASSESSMENT — PAIN DESCRIPTION - PAIN TYPE: TYPE: ACUTE PAIN

## 2022-04-01 ASSESSMENT — PAIN - FUNCTIONAL ASSESSMENT: PAIN_FUNCTIONAL_ASSESSMENT: 0-10

## 2022-04-01 ASSESSMENT — PAIN SCALES - GENERAL
PAINLEVEL_OUTOF10: 10
PAINLEVEL_OUTOF10: 10

## 2022-04-01 ASSESSMENT — PAIN DESCRIPTION - PROGRESSION: CLINICAL_PROGRESSION: GRADUALLY WORSENING

## 2022-04-01 ASSESSMENT — PAIN DESCRIPTION - ORIENTATION: ORIENTATION: LEFT;UPPER

## 2022-04-01 ASSESSMENT — PAIN DESCRIPTION - DESCRIPTORS: DESCRIPTORS: ACHING;SHARP;THROBBING

## 2022-04-01 ASSESSMENT — PAIN DESCRIPTION - LOCATION: LOCATION: TEETH

## 2022-04-01 ASSESSMENT — PAIN DESCRIPTION - FREQUENCY: FREQUENCY: CONTINUOUS

## 2022-04-01 ASSESSMENT — PAIN DESCRIPTION - ONSET: ONSET: ON-GOING

## 2022-04-01 NOTE — ED NOTES
Pt to ed with c/o left upper dental pain, broken tooth. Pt reports pain 10/10, has not taken anything at home for pain.       Deon Yeager RN  04/01/22 5404

## 2022-04-01 NOTE — ED NOTES
SW consulted to set patient up with an emergent appointment at the [facility]. Appointment scheduled for Monday, 4/4/2022 at 9:30am, which was given to patient in writing. Patient has his own transportation to get to the appointment. Referral faxed to the [facility] this day. Moris Cerrato.  Nils Ferguson  04/01/22 1028

## 2022-04-01 NOTE — ED PROVIDER NOTES
[facility] ED     Emergency Department     Faculty Attestation    I performed a history and physical examination of the patient and discussed management with the resident. I reviewed the residents note and agree with the documented findings and plan of care. Any areas of disagreement are noted on the chart. I was personally present for the key portions of any procedures. I have documented in the chart those procedures where I was not present during the key portions. I have reviewed the emergency nurses triage note. I agree with the chief complaint, past medical history, past surgical history, allergies, medications, social and family history as documented unless otherwise noted below. For Physician Assistant/ Nurse Practitioner cases/documentation I have personally evaluated this patient and have completed at least one if not all key elements of the E/M (history, physical exam, and MDM). Additional findings are as noted. This patient was evaluated in the Emergency Department for symptoms described in the history of present illness. He/she was evaluated in the context of the global COVID-19 pandemic, which necessitated consideration that the patient might be at risk for infection with the SARS-CoV-2 virus that causes COVID-19. Institutional protocols and algorithms that pertain to the evaluation of patients at risk for COVID-19 are in a state of rapid change based on information released by regulatory bodies including the CDC and federal and state organizations. These policies and algorithms were followed during the patient's care in the ED. Patient complains of tooth pain. Afebrile. On exam there is no abscess to palpation, Charles's, trismus, or cervical nodes. Will discharge.         Critical Care     none    Natali Tatum MD, Emliee Mclaughlin  Attending Emergency  Physician             Natali Tatum MD  04/01/22 4606

## 2022-04-01 NOTE — ED PROVIDER NOTES
[facility] ED  Emergency Department Encounter  Emergency Medicine Resident     Pt Name: Mehreen Dumont  MRN: [id number]  [place] 1995  Date of evaluation: 4/1/22  PCP:  No primary care provider on file. CHIEF COMPLAINT       Chief Complaint   Patient presents with    Abscess     dental, left sided, x1 day, was on antibiotic but had stopped taking d/t losing it       HISTORY OFPRESENT ILLNESS  (Location/Symptom, Timing/Onset, Context/Setting, Quality, Duration, Modifying Factors,Severity.)      Mehreen Dumont is a 27 y.o. male who presents with dental pain. Patient was seen 3 weeks ago at [facility] for fractured molar dental pain. He was discharged with a course of amoxicillin. Patient states he did not take the full amoxicillin course because he lost the antibiotics. Presents today for increasing pain at tooth. Not able to follow-up with dentist at all. Denies any fevers, chills, difficulty swallowing, eating, drinking    PAST MEDICAL / SURGICAL / SOCIAL / FAMILY HISTORY      has no past medical history on file. has no past surgical history on file.     Social History     Socioeconomic History    Marital status: Single     Spouse name: Not on file    Number of children: Not on file    Years of education: Not on file    Highest education level: Not on file   Occupational History    Not on file   Tobacco Use    Smoking status: Current Every Day Smoker     Packs/day: 0.50     Types: Cigarettes    Smokeless tobacco: Never Used   Vaping Use    Vaping Use: Never used   Substance and Sexual Activity    Alcohol use: Not Currently     Comment: social     Drug use: Not Currently     Types: IV     Comment: snorts Fentanyl daily    Sexual activity: Not Currently   Other Topics Concern    Not on file   Social History Narrative    Not on file     Social Determinants of Health     Financial Resource Strain:     Difficulty of Paying Living Expenses: Not on file Food Insecurity:     Worried About Running Out of Food in the Last Year: Not on file    Christopher of Food in the Last Year: Not on file   Transportation Needs:     Lack of Transportation (Medical): Not on file    Lack of Transportation (Non-Medical): Not on file   Physical Activity:     Days of Exercise per Week: Not on file    Minutes of Exercise per Session: Not on file   Stress:     Feeling of Stress : Not on file   Social Connections:     Frequency of Communication with Friends and Family: Not on file    Frequency of Social Gatherings with Friends and Family: Not on file    Attends Christianity Services: Not on file    Active Member of [address] [place] [address] [employer] or Organizations: Not on file    Attends Club or Organization Meetings: Not on file    Marital Status: Not on file   Intimate Partner Violence:     Fear of Current or Ex-Partner: Not on file    Emotionally Abused: Not on file    Physically Abused: Not on file    Sexually Abused: Not on file   Housing Stability:     Unable to Pay for Housing in the Last Year: Not on file    Number of [place] in the Last Year: Not on file    Unstable Housing in the Last Year: Not on file       History reviewed. No pertinent family history. Allergies:  Patient has no known allergies. Home Medications:  Prior to Admission medications    Medication Sig Start Date End Date Taking?  Authorizing Provider   penicillin v potassium (VEETID) 500 MG tablet Take 1 tablet by mouth 4 times daily for 7 days 4/1/22 4/8/22 Yes Rodrigo Jha DO   acetaminophen (TYLENOL) 500 MG tablet Take 2 tablets by mouth 3 times daily for 7 days 4/1/22 4/8/22 Yes Rodrigo Jha DO   ibuprofen (IBU) 800 MG tablet Take 1 tablet by mouth every 8 hours as needed for Pain 4/1/22 4/8/22 Yes Rodrigo Jha DO   sertraline (ZOLOFT) 25 MG tablet Take 2 tablets by mouth daily 1/5/22   Julieanne Alpers, MD   traZODone (DESYREL) 50 MG tablet Take 1 tablet by mouth nightly as needed for Sleep 1/4/22   Sheryle Orange Rupa Munoz MD       REVIEW OF SYSTEMS    (2-9 systems for level 4, 10 or more for level 5)      Review of Systems   Constitutional: Negative for chills and fever. HENT: Positive for dental problem and facial swelling. Negative for ear pain, hearing loss and sore throat. Eyes: Negative for visual disturbance. Respiratory: Negative for shortness of breath. Cardiovascular: Negative for chest pain. Gastrointestinal: Negative for abdominal pain, constipation, diarrhea, nausea and vomiting. Genitourinary: Negative for difficulty urinating and dysuria. Musculoskeletal: Negative for arthralgias and myalgias. Neurological: Negative for numbness. Psychiatric/Behavioral: Negative for agitation and confusion. PHYSICAL EXAM   (up to 7 for level 4, 8 or more for level 5)     INITIAL VITALS:    height is 5' 11\" (1.803 m) and weight is 250 lb (113.4 kg). His oral temperature is 97.9 °F (36.6 °C). His blood pressure is 137/86 and his pulse is 92. His respiration is 16 and oxygen saturation is 96%. Physical Exam  Vitals and nursing note reviewed. Constitutional:       General: He is not in acute distress. Appearance: Normal appearance. He is well-developed. He is not ill-appearing or diaphoretic. HENT:      Head: Normocephalic and atraumatic. Comments: Cranked tooth #15, MARCIA 2nd molar     Right Ear: External ear normal.      Left Ear: External ear normal.      Nose: Nose normal.      Mouth/Throat:      Mouth: Mucous membranes are moist.   Eyes:      Extraocular Movements: Extraocular movements intact. Conjunctiva/sclera: Conjunctivae normal.   Neck:      Trachea: No tracheal deviation. Cardiovascular:      Rate and Rhythm: Normal rate and regular rhythm. Pulmonary:      Effort: Pulmonary effort is normal. No respiratory distress. Abdominal:      General: Abdomen is flat. There is no distension. Musculoskeletal:         General: No swelling, deformity or signs of injury.  Normal range of motion. Cervical back: Normal range of motion and neck supple. Skin:     General: Skin is warm and dry. Coloration: Skin is not jaundiced. Findings: No bruising or lesion. Neurological:      General: No focal deficit present. Mental Status: He is alert and oriented to person, place, and time. Mental status is at baseline. Motor: No abnormal muscle tone. DIFFERENTIAL  DIAGNOSIS     PLAN (LABS / IMAGING / EKG):  No orders of the defined types were placed in this encounter. MEDICATIONS ORDERED:  Orders Placed This Encounter   Medications    benzocaine (LOLLICAINE) 20 % dental swab    acetaminophen (TYLENOL) tablet 1,000 mg    ibuprofen (ADVIL;MOTRIN) tablet 800 mg    penicillin v potassium (VEETID) 500 MG tablet     Sig: Take 1 tablet by mouth 4 times daily for 7 days     Dispense:  27 tablet     Refill:  0    penicillin v potassium (VEETID) tablet 500 mg     Order Specific Question:   Antimicrobial Indications     Answer:   Head and Neck Infection    acetaminophen (TYLENOL) 500 MG tablet     Sig: Take 2 tablets by mouth 3 times daily for 7 days     Dispense:  42 tablet     Refill:  0    ibuprofen (IBU) 800 MG tablet     Sig: Take 1 tablet by mouth every 8 hours as needed for Pain     Dispense:  21 tablet     Refill:  0       DDX: cracked tooth, dental carries, doubt PTA, periapical abscess, ludvig's angina    Initial MDM/Plan: 27 y.o. male who presents with concerns for pain in tooth. At time of initial examination, patient was in mild distress due to pain but nontoxic appearing, and vital signs were stable. Pt afebrile. Physical exam was pertinent for cracked tooth with some gingival swelling but no concerns for any dental or periapical abscess.  Plan for analgesia, will discuss with  to get patient appointment with dental.    DIAGNOSTIC RESULTS / [facility]     LABS:  Labs Reviewed - No data to display      RADIOLOGY:  No results found.      EMERGENCY DEPARTMENT COURSE:  Pain improved with tylenol, motrin, lollicaine. First dose of pen VK given. Patient has f/u appointment with dental on Monday. Dishcharged w/ rx for tylenol, motrin, pen VK. Follow up plans and ER return precautions discussed. Patient verbalized understanding and had no further questions at time of discharge. PROCEDURES:  None    CONSULTS:  None    CRITICAL CARE:  Please see attending note    FINAL IMPRESSION      1. Pain, dental        DISPOSITION / PLAN     DISPOSITION Decision To Discharge 04/01/2022 10:22:33 AM    PATIENTREFERRED TO:  [facility]  [address] [facility] [address]  [phone]  Go to   As needed, If symptoms worsen    Primary care physician  Please follow-up with your primary care physician within the next 3 to 5 days. If you do not have a primary care physician you can follow-up with one of the physicians provided on the follow-up clinic list.        Dental clinic  Please follow-up with the dental clinic as discussed.   Go on 4/4/2022        DISCHARGE MEDICATIONS:  Discharge Medication List as of 4/1/2022 10:43 AM      START taking these medications    Details   penicillin v potassium (VEETID) 500 MG tablet Take 1 tablet by mouth 4 times daily for 7 days, Disp-27 tablet, R-0Print      acetaminophen (TYLENOL) 500 MG tablet Take 2 tablets by mouth 3 times daily for 7 days, Disp-42 tablet, R-0Print      ibuprofen (IBU) 800 MG tablet Take 1 tablet by mouth every 8 hours as needed for Pain, Disp-21 tablet, R-0Print             Osmin Perrin DO  EmergencyMedicine Resident    (Please note that portions of this note were completed with a voice recognition program.  Efforts were made to edit the dictations but occasionally words are mis-transcribed.)      Jason Amato DO  Resident  04/02/22 9050

## 2022-04-02 ASSESSMENT — ENCOUNTER SYMPTOMS
SHORTNESS OF BREATH: 0
DIARRHEA: 0
FACIAL SWELLING: 1
VOMITING: 0
ABDOMINAL PAIN: 0
NAUSEA: 0
CONSTIPATION: 0
SORE THROAT: 0

## 2022-04-23 ENCOUNTER — HOSPITAL ENCOUNTER (EMERGENCY)
Age: 27
Discharge: HOME | End: 2022-04-23
Attending: EMERGENCY MEDICINE
Payer: MEDICAID

## 2022-04-23 VITALS
SYSTOLIC BLOOD PRESSURE: 142 MMHG | OXYGEN SATURATION: 100 % | HEART RATE: 102 BPM | BODY MASS INDEX: 33.86 KG/M2 | HEIGHT: 72 IN | DIASTOLIC BLOOD PRESSURE: 81 MMHG | WEIGHT: 250 LBS | TEMPERATURE: 96.7 F | RESPIRATION RATE: 18 BRPM

## 2022-04-23 DIAGNOSIS — K02.9 DENTAL CARIES: Primary | ICD-10-CM

## 2022-04-23 DIAGNOSIS — S91.312A LACERATION OF LEFT FOOT, INITIAL ENCOUNTER: ICD-10-CM

## 2022-04-23 PROCEDURE — 99283 EMERGENCY DEPT VISIT LOW MDM: CPT

## 2022-04-23 PROCEDURE — 6370000000 HC RX 637 (ALT 250 FOR IP): Performed by: STUDENT IN AN ORGANIZED HEALTH CARE EDUCATION/TRAINING PROGRAM

## 2022-04-23 RX ORDER — PENICILLIN V POTASSIUM 250 MG/1
500 TABLET ORAL ONCE
Status: COMPLETED | OUTPATIENT
Start: 2022-04-23 | End: 2022-04-23

## 2022-04-23 RX ORDER — PENICILLIN V POTASSIUM 500 MG/1
500 TABLET ORAL 4 TIMES DAILY
Qty: 28 TABLET | Refills: 0 | Status: SHIPPED | OUTPATIENT
Start: 2022-04-23 | End: 2022-04-30

## 2022-04-23 RX ADMIN — PENICILLIN V POTASSIUM 500 MG: 250 TABLET ORAL at 03:12

## 2022-04-23 ASSESSMENT — ENCOUNTER SYMPTOMS
TROUBLE SWALLOWING: 0
VOMITING: 0
SORE THROAT: 0
ABDOMINAL PAIN: 0

## 2022-04-23 ASSESSMENT — PAIN DESCRIPTION - LOCATION: LOCATION: FOOT;TEETH

## 2022-04-23 ASSESSMENT — PAIN SCALES - GENERAL: PAINLEVEL_OUTOF10: 10

## 2022-04-23 ASSESSMENT — PAIN - FUNCTIONAL ASSESSMENT: PAIN_FUNCTIONAL_ASSESSMENT: 0-10

## 2022-04-23 NOTE — ED PROVIDER NOTES
Patient assigned to me in error. I did not participate in evaluation or care of this patient during this encounter.             Jian Silva MD  Resident  04/23/22 1830

## 2022-04-23 NOTE — ED NOTES
Pt reports to the ED with complaints of foot and dental pain. Pt reports attempting to get into the dentist with no success yet. Upon inspection, pt has a small cut on the bottom part of his foot, cut is superficial and no bleeding noted. Pt does not have any other complaints at this time. Pt is A&O x4 and speaking in complete sentences. Pt is resting in bed comfortably, NAD noted.  Will continue to monitor     Kelechi Bettencourt RN  04/23/22 6787

## 2022-04-23 NOTE — ED PROVIDER NOTES
[facility]   Emergency Department  Faculty Attestation       I performed a history and physical examination of the patient and discussed management with the resident. I reviewed the residents note and agree with the documented findings including all diagnostic interpretations and plan of care. Any areas of disagreement are noted on the chart. I was personally present for the key portions of any procedures. I have documented in the chart those procedures where I was not present during the key portions. I have reviewed the emergency nurses triage note. I agree with the chief complaint, past medical history, past surgical history, allergies, medications, social and family history as documented unless otherwise noted below. Documentation of the HPI, Physical Exam and Medical Decision Making performed by scribes is based on my personal performance of the HPI, PE and MDM. For Physician Assistant/ Nurse Practitioner cases/documentation I have personally evaluated this patient and have completed at least one if not all key elements of the E/M (history, physical exam, and MDM). Additional findings are as noted. Pertinent Comments     Primary Care Physician: No primary care provider on file. ED Triage Vitals [04/23/22 0232]   BP Temp Temp Source Pulse Resp SpO2 Height Weight   (!) 142/81 96.7 °F (35.9 °C) Oral 102 18 100 % 6' (1.829 m) 250 lb (113.4 kg)        History/Physical: This is a 27 y.o. male who presents to the Emergency Department with complaint of dental pain for several months as well as a cut on the bottom of his left foot. Dental pain started several months ago, he had put a temporary paced on that someone had given him, but has had minimal relief. He was seen on the first of this month for the same complaint, and was given penicillin and analgesics and instructed to follow-up with the dental clinic. He states that they called and canceled the appointment due to lack of staffing. And he is actively seen since then. Denies any change in voice or difficulty controlling secretions. No trismus. Also complaining of an wound opening up on the bottom of his left foot. States that he is on his feet constantly on his feet and had significantly dry areas in this area just opened up. He did not CoVID-19 on anything specifically. Not stepped on anything. Has not had any fevers or chills with this. No numbness in his feet. On exam there is poor dentition with a fractured upper molar with a temporary paste in place. .  There are no areas of fluctuance. Patient is controlling secretions well and talking in full sentences. There is no elevation of the tongue. No submandibular or sublingual swelling. Neck with no trismus and no swelling. Talking in full sentences with a normal voice and no increased work of breathing. Bilateral feet with dry skin with cracking throughout, the area of the left mid plantar aspect there is a mildly cracked area that made a linear wound that is superficial but painful. There is no purulent drainage. No surrounding erythema. Has normal range of motion of the digits and sensation intact. Past Medical History: reviewed with patient and no pertinent history reported  Past Surgical History: reviewed with patient and no pertinent history reported    MDM/Plan:   Dental Pain with fractured molar  · No signs of a drainable abscess  · Clinically well appearing and no findings consistent with Charles's Angina  · Symptomatic treatment   · Start antibiotics  · Recommend dental follow-up. Wound on the bottom of the left foot   · Appears to be a crack from having dry skin on the feet. It has been open for several days  · No surrounding signs of infection and   · No indication for closure at this time   · Did provide bacitracin and wound dressings as well as some emollient lotion with supplies that patient can use as he does have difficulty with obtaining supplies.       Kecia Mensah for discharge with standard dental pain return precautions and precautions return if any clinical signs or symptoms of infection or worsening of the wound. Patient is agreeable with this plan.       Critical Care: None     Tien Pitts MD  Attending Emergency Physician         Tien Pitts MD  04/23/22 5215

## 2022-04-23 NOTE — ED PROVIDER NOTES
[facility] ED  Emergency Department Encounter  Emergency Medicine Resident     Pt Name: Krissy Crocker  MRN: [id number]  [place] 1995  Date of evaluation: 4/23/22  PCP:  No primary care provider on file. CHIEF COMPLAINT       Chief Complaint   Patient presents with    Laceration     left foot    Dental Pain       HISTORY OFPRESENT ILLNESS  (Location/Symptom, Timing/Onset, Context/Setting, Quality, Duration, Modifying Factors,Severity.)      Krissy Crocker is a 27 y. o.yo male who presents with complaints of laceration over the left bottom of his foot. Patient reports that he noticed pain while ambulating couple days ago however it worsened. Patient reports that he is currently without on home and does have to walk everywhere that he goes. He reports that he did not notice anything cutting his foot however he does notice the pain. Patient also complaining of dental pain over the right maxillary area. Patient reports that he was supposed to see dentistry on the fourth of this month however the appointment was canceled. Patient does state that when he did, earlier this month he was started on penicillin he noticed that that worked well for him. He is requesting to be started on penicillin again. Patient otherwise denies any fever, chills, difficulty in swallowing, drooling, tongue elevation, inability to open up his mouth. PAST MEDICAL / SURGICAL / SOCIAL / FAMILY HISTORY      has no past medical history on file. has no past surgical history on file.      Social History     Socioeconomic History    Marital status: Single     Spouse name: Not on file    Number of children: Not on file    Years of education: Not on file    Highest education level: Not on file   Occupational History    Not on file   Tobacco Use    Smoking status: Current Every Day Smoker     Packs/day: 0.50     Types: Cigarettes    Smokeless tobacco: Never Used   Vaping Use    Vaping Use: Never used   Substance and Sexual Activity    Alcohol use: Not Currently     Comment: social     Drug use: Not Currently     Types: IV     Comment: snorts Fentanyl daily    Sexual activity: Not Currently   Other Topics Concern    Not on file   Social History Narrative    Not on file     Social Determinants of Health     Financial Resource Strain:     Difficulty of Paying Living Expenses: Not on file   Food Insecurity:     Worried About Running Out of Food in the Last Year: Not on file    Christopher of Food in the Last Year: Not on file   Transportation Needs:     Lack of Transportation (Medical): Not on file    Lack of Transportation (Non-Medical): Not on file   Physical Activity:     Days of Exercise per Week: Not on file    Minutes of Exercise per Session: Not on file   Stress:     Feeling of Stress : Not on file   Social Connections:     Frequency of Communication with Friends and Family: Not on file    Frequency of Social Gatherings with Friends and Family: Not on file    Attends Christianity Services: Not on file    Active Member of [address] [place] [address] [employer] or Organizations: Not on file    Attends Club or Organization Meetings: Not on file    Marital Status: Not on file   Intimate Partner Violence:     Fear of Current or Ex-Partner: Not on file    Emotionally Abused: Not on file    Physically Abused: Not on file    Sexually Abused: Not on file   Housing Stability:     Unable to Pay for Housing in the Last Year: Not on file    Number of [place] in the Last Year: Not on file    Unstable Housing in the Last Year: Not on file       History reviewed. No pertinent family history. Allergies:  Patient has no known allergies. Home Medications:  Prior to Admission medications    Medication Sig Start Date End Date Taking?  Authorizing Provider   penicillin v potassium (VEETID) 500 MG tablet Take 1 tablet by mouth 4 times daily for 7 days 4/23/22 4/30/22 Yes Az Abraham MD   acetaminophen (TYLENOL) 500 MG tablet Take 2 tablets by mouth 3 times daily for 7 days 4/1/22 4/8/22  Roxanna Cain,    ibuprofen (IBU) 800 MG tablet Take 1 tablet by mouth every 8 hours as needed for Pain 4/1/22 4/8/22  Roxanna Cain DO       REVIEW OFSYSTEMS    (2-9 systems for level 4, 10 or more for level 5)      Review of Systems   HENT: Positive for dental problem. Negative for sore throat and trouble swallowing. Cardiovascular: Negative for chest pain and leg swelling. Gastrointestinal: Negative for abdominal pain and vomiting. Skin: Positive for wound. Laceration over plantar left foot       PHYSICAL EXAM   (up to 7 for level 4, 8 or more forlevel 5)      INITIAL VITALS:   ED Triage Vitals [04/23/22 0232]   BP Temp Temp Source Pulse Resp SpO2 Height Weight   (!) 142/81 96.7 °F (35.9 °C) Oral 102 18 100 % 6' (1.829 m) 250 lb (113.4 kg)       Physical Exam  Constitutional:       Appearance: Normal appearance. HENT:      Head: Normocephalic and atraumatic. Mouth/Throat:      Mouth: Mucous membranes are moist.      Dentition: Dental tenderness and dental caries present. No dental abscesses. Comments: Patient with multiple dental caries, no obvious abscess seen, no trismus, no drooling, no darnell of submandibular area  Eyes:      Extraocular Movements: Extraocular movements intact. Pupils: Pupils are equal, round, and reactive to light. Cardiovascular:      Rate and Rhythm: Normal rate. Pulmonary:      Effort: Pulmonary effort is normal. No respiratory distress. Abdominal:      General: There is no distension. Tenderness: There is no abdominal tenderness. Musculoskeletal:         General: No swelling or tenderness. Cervical back: No rigidity or tenderness. Feet:    Feet:      Comments: Presence of 2 cm laceration over left plantar foot that is superficial, no obvious drainage  Skin:     General: Skin is warm. Capillary Refill: Capillary refill takes less than 2 seconds. Coloration: Skin is not jaundiced. Neurological:      General: No focal deficit present. Mental Status: He is alert. Psychiatric:         Mood and Affect: Mood normal.         Behavior: Behavior normal.         DIFFERENTIAL  DIAGNOSIS     PLAN (LABS / IMAGING / EKG):  No orders of the defined types were placed in this encounter. MEDICATIONS ORDERED:  Orders Placed This Encounter   Medications    penicillin v potassium (VEETID) tablet 500 mg     Order Specific Question:   Antimicrobial Indications     Answer:   Head and Neck Infection    penicillin v potassium (VEETID) 500 MG tablet     Sig: Take 1 tablet by mouth 4 times daily for 7 days     Dispense:  28 tablet     Refill:  0       Initial MDM/Plan: 27 y.o. male who presents with laceration over his left foot in addition to poor dentition. Patient appears well, not in any acute distress, no obvious abscess over his teeth, the laceration over his left plantar aspect of his foot does not appear to be infected, no oozing of discharge. Will provide patient with topical Neosporin, Kerlix, 4 x 4's to help with wound dressing. Plan will be to discharge patient with penicillin VK.  did report that the dental clinic are short on staff and thus patient will have to call dentist to make an appointment. Information has been provided for him. Patient to be discharged    DIAGNOSTIC RESULTS / EMERGENCYDEPARTMENT COURSE / MDM     LABS:  Labs Reviewed - No data to display      RADIOLOGY:  No results found. EKG      All EKG's are interpreted by the Emergency Department Physicianwho either signs or Co-signs this chart in the absence of a cardiologist.    EMERGENCY DEPARTMENT COURSE:          PROCEDURES:  None    CONSULTS:  None    CRITICAL CARE:      FINAL IMPRESSION      1. Dental caries    2.  Laceration of left foot, initial encounter          DISPOSITION / PLAN     DISPOSITION Decision To Discharge 04/23/2022 03:05:06 AM      PATIENT REFERRED TO:  [facility]  [address] [address]  [phone]          DISCHARGE MEDICATIONS:  Discharge Medication List as of 4/23/2022  3:10 AM      START taking these medications    Details   penicillin v potassium (VEETID) 500 MG tablet Take 1 tablet by mouth 4 times daily for 7 days, Disp-28 tablet, R-0Print             Eloisa Rouse MD  Emergency Medicine Resident    (Please note that portions of this note were completed with a voice recognition program.Efforts were made to edit the dictations but occasionally words are mis-transcribed.)        Eloisa Rouse MD  Resident  04/23/22 0313

## 2022-05-27 ENCOUNTER — HOSPITAL ENCOUNTER (EMERGENCY)
Age: 27
Discharge: HOME | End: 2022-05-27
Attending: EMERGENCY MEDICINE
Payer: MEDICAID

## 2022-05-27 VITALS
RESPIRATION RATE: 16 BRPM | BODY MASS INDEX: 35 KG/M2 | SYSTOLIC BLOOD PRESSURE: 128 MMHG | WEIGHT: 250 LBS | HEIGHT: 71 IN | TEMPERATURE: 98.3 F | DIASTOLIC BLOOD PRESSURE: 64 MMHG | OXYGEN SATURATION: 100 % | HEART RATE: 86 BPM

## 2022-05-27 DIAGNOSIS — K02.9 DENTAL CARIES: ICD-10-CM

## 2022-05-27 DIAGNOSIS — K08.89 PAIN, DENTAL: Primary | ICD-10-CM

## 2022-05-27 PROCEDURE — 99283 EMERGENCY DEPT VISIT LOW MDM: CPT

## 2022-05-27 PROCEDURE — 6370000000 HC RX 637 (ALT 250 FOR IP): Performed by: STUDENT IN AN ORGANIZED HEALTH CARE EDUCATION/TRAINING PROGRAM

## 2022-05-27 PROCEDURE — 64400 NJX AA&/STRD TRIGEMINAL NRV: CPT

## 2022-05-27 RX ORDER — ACETAMINOPHEN 500 MG
1000 TABLET ORAL 3 TIMES DAILY
Qty: 42 TABLET | Refills: 0 | Status: SHIPPED | OUTPATIENT
Start: 2022-05-27 | End: 2022-06-03

## 2022-05-27 RX ORDER — IBUPROFEN 600 MG/1
600 TABLET ORAL EVERY 6 HOURS PRN
Qty: 21 TABLET | Refills: 0 | Status: SHIPPED | OUTPATIENT
Start: 2022-05-27 | End: 2022-06-03

## 2022-05-27 RX ORDER — PENICILLIN V POTASSIUM 250 MG/1
500 TABLET ORAL ONCE
Status: COMPLETED | OUTPATIENT
Start: 2022-05-27 | End: 2022-05-27

## 2022-05-27 RX ORDER — IBUPROFEN 800 MG/1
800 TABLET ORAL EVERY 8 HOURS PRN
Qty: 21 TABLET | Refills: 0 | Status: SHIPPED | OUTPATIENT
Start: 2022-05-27 | End: 2022-05-27

## 2022-05-27 RX ORDER — ACETAMINOPHEN 500 MG
1000 TABLET ORAL ONCE
Status: COMPLETED | OUTPATIENT
Start: 2022-05-27 | End: 2022-05-27

## 2022-05-27 RX ORDER — IBUPROFEN 800 MG/1
800 TABLET ORAL ONCE
Status: COMPLETED | OUTPATIENT
Start: 2022-05-27 | End: 2022-05-27

## 2022-05-27 RX ORDER — ACETAMINOPHEN 500 MG
1000 TABLET ORAL EVERY 6 HOURS PRN
Qty: 540 TABLET | Refills: 1 | Status: SHIPPED | OUTPATIENT
Start: 2022-05-27 | End: 2022-06-03

## 2022-05-27 RX ORDER — PENICILLIN V POTASSIUM 500 MG/1
500 TABLET ORAL 4 TIMES DAILY
Qty: 40 TABLET | Refills: 0 | Status: SHIPPED | OUTPATIENT
Start: 2022-05-27 | End: 2022-05-27

## 2022-05-27 RX ORDER — PENICILLIN V POTASSIUM 500 MG/1
500 TABLET ORAL 4 TIMES DAILY
Qty: 28 TABLET | Refills: 0 | Status: SHIPPED | OUTPATIENT
Start: 2022-05-27 | End: 2022-06-03

## 2022-05-27 RX ORDER — BUPIVACAINE HYDROCHLORIDE AND EPINEPHRINE 5; 5 MG/ML; UG/ML
1 INJECTION, SOLUTION PERINEURAL ONCE
Status: DISCONTINUED | OUTPATIENT
Start: 2022-05-27 | End: 2022-05-27 | Stop reason: HOSPADM

## 2022-05-27 RX ADMIN — ACETAMINOPHEN 1000 MG: 500 TABLET ORAL at 21:03

## 2022-05-27 RX ADMIN — IBUPROFEN 800 MG: 800 TABLET, FILM COATED ORAL at 21:03

## 2022-05-27 RX ADMIN — PENICILLIN V POTASSIUM 500 MG: 250 TABLET ORAL at 21:03

## 2022-05-27 ASSESSMENT — PAIN SCALES - GENERAL
PAINLEVEL_OUTOF10: 10
PAINLEVEL_OUTOF10: 8

## 2022-05-27 ASSESSMENT — PAIN - FUNCTIONAL ASSESSMENT: PAIN_FUNCTIONAL_ASSESSMENT: 0-10

## 2022-05-27 ASSESSMENT — PAIN DESCRIPTION - DESCRIPTORS: DESCRIPTORS: ACHING

## 2022-05-27 ASSESSMENT — PAIN DESCRIPTION - LOCATION: LOCATION: TEETH

## 2022-05-28 NOTE — ED PROVIDER NOTES
[facility]     Emergency Department     Faculty Note/ Attestation      Pt Name: Gabriele Jimenez                                       MRN: [id number]  [place] 1995  Date of evaluation: 5/27/2022    Patients PCP:    No primary care provider on file. Attestation  I performed a history and physical examination of the patient and discussed management with the resident. I reviewed the residents note and agree with the documented findings and plan of care. Any areas of disagreement are noted on the chart. I was personally present for the key portions of any procedures. I have documented in the chart those procedures where I was not present during the key portions. I have reviewed the emergency nurses triage note. I agree with the chief complaint, past medical history, past surgical history, allergies, medications, social and family history as documented unless otherwise noted below. For Physician Assistant/ Nurse Practitioner cases/documentation I have personally evaluated this patient and have completed at least one if not all key elements of the E/M (history, physical exam, and MDM). Additional findings are as noted.     Initial Screens:        [place] Coma Scale  Eye Opening: Spontaneous  Best Verbal Response: Oriented  Best Motor Response: Obeys commands  [place] Coma Scale Score: 15    Vitals:    Vitals:    05/27/22 2052   BP: 128/64   Pulse: 86   Resp: 16   Temp: 98.3 °F (36.8 °C)   SpO2: 100%   Weight: 250 lb (113.4 kg)   Height: 5' 11\" (1.803 m)       CHIEF COMPLAINT       Chief Complaint   Patient presents with    Dental Pain       Is a 75-year-old male with upper left-sided tooth pain and cracked dental carry patient has no follow-up no dentist has tried nothing for the pain no fevers chills difficulty speaking or swallowing        EMERGENCY DEPARTMENT COURSE:     -------------------------  BP: 128/64, Temp: 98.3 °F (36.8 °C), Heart Rate: 86, Resp: 16   The patient has no uvular deviation, no palatal elevation, no difficulty speaking, no trismus, no muffled voice, no tongue swelling, no tonsillar abscess. Comments  Patient can be safely discharged with follow-up         Natali Oliveros DO,, , RDMS.   Attending Emergency Physician          Natali Oliveros DO  05/27/22 6348

## 2022-05-28 NOTE — ED PROVIDER NOTES
I did not see or evaluate this patient. Chart entered in error. Patient was being seen by another provider.     Anthony Ashby MS, RD  PGY-3 Emergency Medicine         Abdon Bridges DO  Resident  05/27/22 2428

## 2022-05-28 NOTE — ED PROVIDER NOTES
[facility] ED  Emergency Department Encounter  Emergency Medicine Resident     Pt Name: Roly Heaton  MRN: [id number]  [place] 1995  Date of evaluation: 5/27/22  PCP:  No primary care provider on file. CHIEF COMPLAINT       Chief Complaint   Patient presents with    Dental Pain       HISTORY OFPRESENT ILLNESS  (Location/Symptom, Timing/Onset, Context/Setting, Quality, Duration, Modifying Factors,Severity.)      Roly Heaton is a 27 y.o. male with significant past medical history of presents with dental pain which has been ongoing for the past 2 weeks. Patient does not see a dentist, has not seen a dentist\" sometime\". Patient states about 2 weeks ago he noted that his left upper tooth was bothering him, it did crack and break at that time. Patient denies any fevers, chills, difficulty swallowing, hoarseness, chest pain, shortness of breath, abdominal pain. Patient denies any allergies to antibiotics. Has not had any recent antibiotics. Has not been trying anything at home for symptomatic relief. Luis Snyder that it worsened today and therefore came into the ER. Patient states that it is a throbbing achy pain moderate to severe in nature which has been coming and going although worsened today. No other acute complaints at this time. PAST MEDICAL / SURGICAL / SOCIAL / FAMILY HISTORY      has no past medical history on file. has no past surgical history on file. Social:  reports that he has been smoking cigarettes. He has been smoking about 0.50 packs per day. He has never used smokeless tobacco. He reports previous alcohol use. He reports previous drug use. Drug: IV. Family Hx: No family history on file. Allergies:  Patient has no known allergies. Home Medications:  Prior to Admission medications    Medication Sig Start Date End Date Taking?  Authorizing Provider   acetaminophen (TYLENOL) 500 MG tablet Take 2 tablets by mouth 3 times daily for 7 days 5/27/22 6/3/22 Yes Aron Brewster, DO   benzocaine (ORAJEL) 20 % GEL mucosal gel Take 1 drop by mouth 2 times daily as needed for Pain 5/27/22  Yes Maryann Rodriges, DO   acetaminophen (TYLENOL) 500 MG tablet Take 2 tablets by mouth every 6 hours as needed for Pain 5/27/22 6/3/22 Yes Fabi Kohler, DO   ibuprofen (IBU) 600 MG tablet Take 1 tablet by mouth every 6 hours as needed for Pain 5/27/22 6/3/22 Yes Luca Vaughan, DO   penicillin v potassium (VEETID) 500 MG tablet Take 1 tablet by mouth 4 times daily for 7 days 5/27/22 6/3/22 Yes Fabi Kohler DO       REVIEW OFSYSTEMS    (2-9 systems for level 4, 10 or more for level 5)      Positive: Dental pain, dental caries    Negative: Weavers, chills, eye pain, ear pain, nasal congestion, difficulty swallowing, difficulty breathing, chest pain, shortness of breath, abdominal pain, nausea, vomiting, pain with movement of the neck, neck rigidity    PHYSICAL EXAM   (up to 7 for level 4, 8 or more forlevel 5)      INITIAL VITALS:   Vitals:    05/27/22 2052   BP: 128/64   Pulse: 86   Resp: 16   Temp: 98.3 °F (36.8 °C)   SpO2: 100%        Physical Exam  Vitals and nursing note reviewed. Constitutional:       General: He is not in acute distress. Appearance: He is not ill-appearing, toxic-appearing or diaphoretic. HENT:      Head: Normocephalic and atraumatic. Nose: Nose normal.      Mouth/Throat:      Mouth: Mucous membranes are moist.      Pharynx: Oropharynx is clear. Comments: Fusilli poor dentition. No abscesses, no sublingual or submandibular swelling, easily tolerating secretions, no hoarseness, speaking in full sentences. No palpable abscesses that appear to be drainable. Eyes:      Pupils: Pupils are equal, round, and reactive to light. Cardiovascular:      Rate and Rhythm: Normal rate and regular rhythm. Heart sounds: No murmur heard. No friction rub. No gallop.     Pulmonary:      Effort: Pulmonary effort is normal. No respiratory distress. Breath sounds: Normal breath sounds. No wheezing. Abdominal:      General: Abdomen is flat. There is no distension. Palpations: Abdomen is soft. Tenderness: There is no abdominal tenderness. Musculoskeletal:      Cervical back: Normal range of motion. Skin:     General: Skin is warm and dry. Neurological:      Mental Status: He is alert and oriented to person, place, and time. Psychiatric:         Mood and Affect: Mood normal.         Behavior: Behavior normal.           DIFFERENTIAL  DIAGNOSIS       Initial MDM/Plan: 27 y.o. male who presents with dental pain of left upper molar. Upon initial examination patient resting comfortably cot, no acute distress no respiratory distress, speaking full sentences, no hoarseness noted, moving neck freely, GCS 15, patient is alert, oriented, answering questions appropriately. Vital signs are within normal limits including patient being afebrile, nontachycardic, saturating 100% on room air, normotensive. Patient has diffusely poor dentition on physical exam.  No sublingual, no submandibular swelling. Easily tolerating secretions, moving neck freely, able to move neck through full range of motion. Left upper molar is broken with obvious cavities and caries throughout. No obvious abscesses palpable. Will provide patient with superior alveolar block for symptomatic relief. Tylenol Motrin for symptomatic relief. Initial dose of penicillin, written prescription for Tylenol, Motrin for symptomatic relief and penicillin for diffusely poor dentition and dental prophylaxis. Will provide with information follow-up with dental clinic. No indication for further laboratory or imaging work-up at this time. Low suspicion for systemic infection, low suspicion for abscess, low suspicion for Ludewig's for retropharyngeal abscess as patient is moving neck freely, no sublingual or submandibular swelling swelling.   Easily tolerating secretions. patient tolerated superior alveolar block well. No acute complications. We will plan for discharge. Strict return precautions provided. Patient expresses understanding of discharge instructions and is able to repeat strict return precautions back to me. Patient discharged in stable condition after remained vitally stable throughout emergency department stay. DIAGNOSTIC RESULTS / EMERGENCYDEPARTMENT COURSE / MDM             PROCEDURES:  Dental Nerve Block    Date/Time: 5/27/2022 9:57 PM  Performed by: Godwin العلي DO  Authorized by: Doss Sacks, DO     Consent:     Consent obtained:  Verbal    Consent given by:  Patient    Risks discussed:  Nerve damage, swelling, pain, intravascular injection, hematoma and unsuccessful block    Alternatives discussed:  No treatment  Indications:     Indications: dental pain    Location:     Block type: Anterior superior alveolar    Laterality:  Left  Procedure details (see MAR for exact dosages):     Syringe type:  Controlled syringe    Needle gauge:  27 G    Anesthetic injected:  Bupivacaine 0.5% WITH epi    Injection procedure:  Anatomic landmarks identified, introduced needle, negative aspiration for blood and anatomic landmarks palpated  Post-procedure details:     Outcome:  Pain improved    Patient tolerance of procedure: Tolerated well, no immediate complications        CONSULTS:  None      FINAL IMPRESSION      1. Pain, dental    2.  Dental caries          DISPOSITION / PLAN     DISPOSITION Decision To Discharge 05/27/2022 09:34:09 PM      PATIENT REFERRED TO:  [facility]  [facility]  [phone]    If facial swelling fevers chills difficulty breathing talking or swallowing develop    Dental follow up  Either  appt or on list provided        [facility]  [address], [address] [id number]  [phone]  Call   for post emergency department follow up      DISCHARGE MEDICATIONS:  Discharge Medication List as of 5/27/2022  9:44 PM      START taking these medications    Details   benzocaine (ORAJEL) 20 % GEL mucosal gel Take 1 drop by mouth 2 times daily as needed for Pain, Disp-30 g, R-0Print      !! acetaminophen (TYLENOL) 500 MG tablet Take 2 tablets by mouth every 6 hours as needed for Pain, Disp-540 tablet, R-1Print      penicillin v potassium (VEETID) 500 MG tablet Take 1 tablet by mouth 4 times daily for 7 days, Disp-28 tablet, R-0Print       !! - Potential duplicate medications found. Please discuss with provider.           Shea Oh DO  Emergency Medicine Resident    (Please note that portions of this note were completed with a voice recognition program.Efforts were made to edit the dictations but occasionally words are mis-transcribed.)       Shea Oh DO  Resident  05/27/22 5213